# Patient Record
Sex: FEMALE | Race: WHITE | Employment: FULL TIME | URBAN - METROPOLITAN AREA
[De-identification: names, ages, dates, MRNs, and addresses within clinical notes are randomized per-mention and may not be internally consistent; named-entity substitution may affect disease eponyms.]

---

## 2024-01-03 NOTE — PROGRESS NOTES
Subjective   Patient ID: Anastasia Lindsey is a 63 y.o. female who presents for Annual Exam.     DR AYALA PT      1-9-23 neg-neg  PER PATIENT, always normal  MAMMO 1-12-23  DEXA 1-11-22 normal  COLON -3-24-23(FAMILY HISTORY PLUS POLYPS) goes every 2-3 years.     , C/S x2. Menopause 57. No HRT. No VB. Takes MVI with D. Uses external steroid cream once a week. Can wake her up at night. It's Clotrimazole/betam. It worked initially but not working as weel now.     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     maunt and cousin breast cancer.     Review of Systems   Genitourinary:         Vaginal itching.   All other systems reviewed and are negative.      Objective   Constitutional: Alert and in no acute distress. Well developed, well nourished.  Neck: no neck asymmetry. Supple and thyroid not enlarged and there were no palpable thyroid nodules.  Chest: Breasts normal appearance, no nipple discharge and no skin changes and palpation of breasts and axillae: no palpable mass and no axillary lymphadenopathy.  Abdomen: soft nontender; no abdominal mass palpated, no organomegaly and no hernias.  Genitourinary: external genitalia: little inflamed left upper labia majora with excoriations, no white lesions, no inguinal lymphadenopathy, Bartholin's urethral and [place]'s glands: normal, urethra: normal and bladder: normal on palpation.  Vagina: normal.  Cervix: normal.  Uterus: normal.   Right adnexa/parametria: normal.  Left adnexa/parametria: normal.  Skin: normal skin color and pigmentation, normal skin turgor and no rash.  Psychiatric: alert and oriented x 3, affect normal to patient baseline and mood appropriate.     Assessment/Plan   -no pap  -aviva-edel  -Vulvar handout given. Follow up vulvar biopsy. Stop using body wash. Doesn't use pads.

## 2024-01-04 ENCOUNTER — OFFICE VISIT (OUTPATIENT)
Dept: OBSTETRICS AND GYNECOLOGY | Facility: CLINIC | Age: 64
End: 2024-01-04
Payer: COMMERCIAL

## 2024-01-04 VITALS — BODY MASS INDEX: 33.47 KG/M2 | HEIGHT: 69 IN | WEIGHT: 226 LBS

## 2024-01-04 DIAGNOSIS — Z01.419 WELL WOMAN EXAM WITH ROUTINE GYNECOLOGICAL EXAM: Primary | ICD-10-CM

## 2024-01-04 PROCEDURE — 99396 PREV VISIT EST AGE 40-64: CPT | Performed by: OBSTETRICS & GYNECOLOGY

## 2024-01-04 PROCEDURE — 1036F TOBACCO NON-USER: CPT | Performed by: OBSTETRICS & GYNECOLOGY

## 2024-01-04 RX ORDER — MULTIVITAMIN
1 TABLET ORAL
COMMUNITY
Start: 2013-12-13

## 2024-01-04 RX ORDER — TRIAMTERENE/HYDROCHLOROTHIAZID 37.5-25 MG
TABLET ORAL
COMMUNITY
Start: 2020-01-03

## 2024-01-04 RX ORDER — ASPIRIN 81 MG/1
1 TABLET ORAL
COMMUNITY
Start: 2023-11-22

## 2024-01-04 RX ORDER — PROPRANOLOL/HYDROCHLOROTHIAZID 40 MG-25MG
TABLET ORAL
COMMUNITY

## 2024-01-04 RX ORDER — EZETIMIBE 10 MG/1
10 TABLET ORAL
COMMUNITY
Start: 2023-12-27

## 2024-01-04 RX ORDER — ROSUVASTATIN CALCIUM 10 MG/1
1 TABLET, COATED ORAL NIGHTLY
COMMUNITY
Start: 2023-09-05

## 2024-01-11 NOTE — PROGRESS NOTES
Patient ID: Anastasia Lindsey is a 63 y.o. female.    external steroid cream once a week. Can wake her up at night. It's Clotrimazole/betam. It worked initially but not working as weel now.     Patient stopped body wash, did handout, symptoms are still the same.    Biopsy vulva    Date/Time: 1/12/2024 12:00 PM    Performed by: Nadege Lockett MD  Authorized by: Nadege Lockett MD    Consent:     Consent obtained:  Written    Consent given by:  Patient    Risks discussed:  Bleeding and infection  Universal protocol:     Procedure explained and questions answered to patient or proxy's satisfaction: yes    Pre-procedure details:     Skin preparation:  Chlorhexidine    Preparation: Patient was prepped and draped in the usual sterile fashion    Anesthesia:     Anesthesia method:  Local infiltration    Local anesthetic:  Lidocaine 1% w/o epi  Procedure specific details:      A 5 mm punch biopsy was taken at the left upper labia majora. Patient identified this as the worse area. Touched with silver nitrate with good hemostasis.  Post-procedure details:     Procedure completion:  Tolerated

## 2024-01-12 ENCOUNTER — OFFICE VISIT (OUTPATIENT)
Dept: OBSTETRICS AND GYNECOLOGY | Facility: CLINIC | Age: 64
End: 2024-01-12
Payer: COMMERCIAL

## 2024-01-12 VITALS
HEIGHT: 69 IN | DIASTOLIC BLOOD PRESSURE: 80 MMHG | WEIGHT: 220 LBS | BODY MASS INDEX: 32.58 KG/M2 | SYSTOLIC BLOOD PRESSURE: 130 MMHG

## 2024-01-12 DIAGNOSIS — L29.2 VULVAR ITCHING: Primary | ICD-10-CM

## 2024-01-12 PROCEDURE — 88312 SPECIAL STAINS GROUP 1: CPT

## 2024-01-12 PROCEDURE — 88305 TISSUE EXAM BY PATHOLOGIST: CPT | Performed by: PATHOLOGY

## 2024-01-12 PROCEDURE — 88305 TISSUE EXAM BY PATHOLOGIST: CPT

## 2024-01-12 PROCEDURE — 88312 SPECIAL STAINS GROUP 1: CPT | Performed by: PATHOLOGY

## 2024-01-12 PROCEDURE — 1036F TOBACCO NON-USER: CPT | Performed by: OBSTETRICS & GYNECOLOGY

## 2024-01-12 PROCEDURE — 56605 BIOPSY OF VULVA/PERINEUM: CPT | Performed by: OBSTETRICS & GYNECOLOGY

## 2024-01-17 LAB
LABORATORY COMMENT REPORT: NORMAL
PATH REPORT.FINAL DX SPEC: NORMAL
PATH REPORT.GROSS SPEC: NORMAL
PATH REPORT.RELEVANT HX SPEC: NORMAL
PATH REPORT.TOTAL CANCER: NORMAL

## 2024-01-23 ENCOUNTER — TELEPHONE (OUTPATIENT)
Dept: OBSTETRICS AND GYNECOLOGY | Facility: CLINIC | Age: 64
End: 2024-01-23
Payer: COMMERCIAL

## 2024-01-23 RX ORDER — CLOBETASOL PROPIONATE 0.5 MG/G
OINTMENT TOPICAL 2 TIMES DAILY
Qty: 30 G | Refills: 1 | Status: SHIPPED | OUTPATIENT
Start: 2024-01-23 | End: 2024-02-19 | Stop reason: ALTCHOICE

## 2024-02-12 ENCOUNTER — HOSPITAL ENCOUNTER (OUTPATIENT)
Dept: RADIOLOGY | Facility: CLINIC | Age: 64
Discharge: HOME | End: 2024-02-12
Payer: COMMERCIAL

## 2024-02-12 DIAGNOSIS — Z12.31 ENCOUNTER FOR SCREENING MAMMOGRAM FOR MALIGNANT NEOPLASM OF BREAST: ICD-10-CM

## 2024-02-12 PROCEDURE — 77063 BREAST TOMOSYNTHESIS BI: CPT | Performed by: RADIOLOGY

## 2024-02-12 PROCEDURE — 77067 SCR MAMMO BI INCL CAD: CPT | Performed by: RADIOLOGY

## 2024-02-12 PROCEDURE — 77067 SCR MAMMO BI INCL CAD: CPT

## 2024-02-13 ENCOUNTER — TELEPHONE (OUTPATIENT)
Dept: OBSTETRICS AND GYNECOLOGY | Facility: CLINIC | Age: 64
End: 2024-02-13
Payer: COMMERCIAL

## 2024-02-13 DIAGNOSIS — R92.8 ABNORMAL MAMMOGRAM: Primary | ICD-10-CM

## 2024-02-16 ENCOUNTER — HOSPITAL ENCOUNTER (OUTPATIENT)
Dept: RADIOLOGY | Facility: CLINIC | Age: 64
Discharge: HOME | End: 2024-02-16
Payer: COMMERCIAL

## 2024-02-16 DIAGNOSIS — R92.8 ABNORMAL MAMMOGRAM: ICD-10-CM

## 2024-02-16 PROCEDURE — 76642 ULTRASOUND BREAST LIMITED: CPT | Mod: LEFT SIDE | Performed by: STUDENT IN AN ORGANIZED HEALTH CARE EDUCATION/TRAINING PROGRAM

## 2024-02-16 PROCEDURE — 77061 BREAST TOMOSYNTHESIS UNI: CPT | Mod: LEFT SIDE | Performed by: STUDENT IN AN ORGANIZED HEALTH CARE EDUCATION/TRAINING PROGRAM

## 2024-02-16 PROCEDURE — 77065 DX MAMMO INCL CAD UNI: CPT | Mod: LEFT SIDE | Performed by: STUDENT IN AN ORGANIZED HEALTH CARE EDUCATION/TRAINING PROGRAM

## 2024-02-16 PROCEDURE — 77061 BREAST TOMOSYNTHESIS UNI: CPT | Mod: LT

## 2024-02-16 PROCEDURE — 76642 ULTRASOUND BREAST LIMITED: CPT | Mod: LT

## 2024-02-16 NOTE — PROGRESS NOTES
Subjective   Patient ID: Anastasia Lindsey is a 64 y.o. female who presents for Follow-up.  Doing well after biopsy. Clobetasol working well.     Had routine mammogram 2/12/24, came home and had pain from side to nipple. Sticky clear discharge from left side. Went back on 2/16 for additional views. Discharge only on the left. Patient not squeezing it.       Objective   Constitutional: Alert and in no acute distress. Well developed, well nourished.  Breast: negative left breast. Scant clear discharge on surface.  Abdomen: soft nontender; no abdominal mass palpated, no organomegaly and no hernias.  Genitourinary: external genitalia: normal, no inguinal lymphadenopathy, Bartholin's urethral and [place]'s glands: normal, urethra: normal and bladder: normal on palpation.  Completely healed. Do not see the excoriations, etc as before.  Psychiatric: alert and oriented x 3, affect normal to patient baseline and mood appropriate.     Assessment/Plan   -It has only been a week since her first mammogram when the discharge started. Avoid any stimulation and will give at least another week for discharge to stop. Imaging showed only dilated ducts.  -Vulva healed. Will wean off Clobetasol in the next few months. I do not think she will need long term suppression. Continue vulvar care.

## 2024-02-19 ENCOUNTER — OFFICE VISIT (OUTPATIENT)
Dept: OBSTETRICS AND GYNECOLOGY | Facility: CLINIC | Age: 64
End: 2024-02-19
Payer: COMMERCIAL

## 2024-02-19 VITALS — WEIGHT: 223 LBS | SYSTOLIC BLOOD PRESSURE: 160 MMHG | BODY MASS INDEX: 32.93 KG/M2 | DIASTOLIC BLOOD PRESSURE: 80 MMHG

## 2024-02-19 DIAGNOSIS — N64.52 NIPPLE DISCHARGE: ICD-10-CM

## 2024-02-19 DIAGNOSIS — L29.2 VULVAR ITCHING: Primary | ICD-10-CM

## 2024-02-19 PROCEDURE — 1036F TOBACCO NON-USER: CPT | Performed by: OBSTETRICS & GYNECOLOGY

## 2024-02-19 PROCEDURE — 99213 OFFICE O/P EST LOW 20 MIN: CPT | Performed by: OBSTETRICS & GYNECOLOGY

## 2024-02-19 RX ORDER — CLOTRIMAZOLE AND BETAMETHASONE DIPROPIONATE 10; .64 MG/G; MG/G
CREAM TOPICAL 2 TIMES DAILY
COMMUNITY

## 2024-02-19 RX ORDER — DOCOSAHEXAENOIC ACID/EPA 120-180 MG
CAPSULE ORAL DAILY
COMMUNITY

## 2024-05-02 ENCOUNTER — TELEPHONE (OUTPATIENT)
Dept: OBSTETRICS AND GYNECOLOGY | Facility: CLINIC | Age: 64
End: 2024-05-02
Payer: COMMERCIAL

## 2024-05-02 DIAGNOSIS — L29.2 VULVAR ITCHING: Primary | ICD-10-CM

## 2024-05-02 RX ORDER — CLOBETASOL PROPIONATE 0.5 MG/G
OINTMENT TOPICAL 2 TIMES DAILY
Qty: 30 G | Refills: 1 | Status: SHIPPED | OUTPATIENT
Start: 2024-05-02

## 2024-05-02 RX ORDER — CLOBETASOL PROPIONATE 0.5 MG/G
OINTMENT TOPICAL 2 TIMES DAILY
Status: CANCELLED | OUTPATIENT
Start: 2024-05-02

## 2024-05-02 RX ORDER — CLOBETASOL PROPIONATE 0.5 MG/G
OINTMENT TOPICAL 2 TIMES DAILY
COMMUNITY

## 2024-05-02 RX ORDER — CLOBETASOL PROPIONATE 0.5 MG/G
CREAM TOPICAL 2 TIMES DAILY
COMMUNITY
End: 2024-05-02 | Stop reason: ENTERED-IN-ERROR

## 2024-11-01 DIAGNOSIS — L29.2 VULVAR ITCHING: ICD-10-CM

## 2024-11-01 RX ORDER — CLOBETASOL PROPIONATE 0.5 MG/G
OINTMENT TOPICAL 2 TIMES DAILY
Qty: 30 G | Refills: 0 | Status: SHIPPED | OUTPATIENT
Start: 2024-11-01

## 2024-12-19 ENCOUNTER — TELEPHONE (OUTPATIENT)
Dept: OBSTETRICS AND GYNECOLOGY | Facility: HOSPITAL | Age: 64
End: 2024-12-19

## 2025-01-23 ENCOUNTER — APPOINTMENT (OUTPATIENT)
Dept: OBSTETRICS AND GYNECOLOGY | Facility: CLINIC | Age: 65
End: 2025-01-23
Payer: COMMERCIAL

## 2025-01-23 VITALS
SYSTOLIC BLOOD PRESSURE: 130 MMHG | HEIGHT: 69 IN | BODY MASS INDEX: 33.33 KG/M2 | DIASTOLIC BLOOD PRESSURE: 70 MMHG | WEIGHT: 225 LBS

## 2025-01-23 DIAGNOSIS — Z12.31 ENCOUNTER FOR SCREENING MAMMOGRAM FOR BREAST CANCER: Primary | ICD-10-CM

## 2025-01-23 DIAGNOSIS — Z01.419 WELL WOMAN EXAM WITH ROUTINE GYNECOLOGICAL EXAM: ICD-10-CM

## 2025-01-23 PROCEDURE — 3008F BODY MASS INDEX DOCD: CPT | Performed by: OBSTETRICS & GYNECOLOGY

## 2025-01-23 PROCEDURE — 99396 PREV VISIT EST AGE 40-64: CPT | Performed by: OBSTETRICS & GYNECOLOGY

## 2025-01-23 PROCEDURE — 1036F TOBACCO NON-USER: CPT | Performed by: OBSTETRICS & GYNECOLOGY

## 2025-02-13 ENCOUNTER — HOSPITAL ENCOUNTER (OUTPATIENT)
Dept: RADIOLOGY | Facility: CLINIC | Age: 65
Discharge: HOME | End: 2025-02-13
Payer: COMMERCIAL

## 2025-02-13 VITALS — HEIGHT: 69 IN | BODY MASS INDEX: 31.84 KG/M2 | WEIGHT: 215 LBS

## 2025-02-13 DIAGNOSIS — Z12.31 ENCOUNTER FOR SCREENING MAMMOGRAM FOR BREAST CANCER: ICD-10-CM

## 2025-02-13 PROCEDURE — 77067 SCR MAMMO BI INCL CAD: CPT | Performed by: RADIOLOGY

## 2025-02-13 PROCEDURE — 77067 SCR MAMMO BI INCL CAD: CPT

## 2025-02-13 PROCEDURE — 77063 BREAST TOMOSYNTHESIS BI: CPT | Performed by: RADIOLOGY

## 2025-05-17 NOTE — PROGRESS NOTES
Subjective   Patient ID: Anastasia Lindsey is a 65 y.o. female who presents for emb.    Patient recently had a CT scan at the [facility] for hematuria. They saw a thickened endometrium. On 4/26/25 had ultrasound follow up that showed a 1.3 cm lining with increased vascularity.  Has cystoscopy in June to follow up on hematuria. Saw blood in urine. Floating matter in urine. Never wears a pad. But has noticed blood on underwear since ultrasound.    She had a D&C in 2017. With Dr. Osei. She was menopausal but would get gushes of blood but go months without. No findings at D&C, no bleeding since.      Objective   Constitutional: Alert and in no acute distress. Well developed, well nourished.  Abdomen: soft nontender; no abdominal mass palpated, no organomegaly and no hernias.  Genitourinary: external genitalia: normal, no inguinal lymphadenopathy, Bartholin's urethral and [place]'s glands: normal, urethra: normal and bladder: normal on palpation.  Vagina: normal. Little pink discharge.  Cervix: normal.  Uterus: normal.   Right adnexa/parametria: normal.  Left adnexa/parametria: normal.  Psychiatric: alert and oriented x 3, affect normal to patient baseline and mood appropriate.     Assessment/Plan   -EMB done, will call with results.  -Continues to do work up for hematuria. Possible that blood is only coming from the vagina.      Endometrial biopsy    Date/Time: 5/19/2025 9:59 AM    Performed by: Nadege Lockett MD  Authorized by: Nadege Lockett MD    Consent:     Consent obtained: written    Consent given by: patient    Risks discussed: bleeding and infection  Indications:     Indications: postmenopausal bleeding    Pre-procedure:     Urine pregnancy test: N/A    Procedure:     A bimanual exam was performed: yes      Uterus position: anteverted    Prepped with: Betadine    Tenaculum used: yes      A local block was performed: yes      Local anesthetic: lidocaine 1% w/o epi    Amount used (mL): 3    Cervix dilated: no       Number of passes: 3  Findings:     Cervix: normal      Uterus depth by sound (cm): 10    Specimen collected: specimen collected and sent to pathology      Patient tolerance: tolerated well, no immediate complications  Comments:     Procedure comments: Moderate tissue with 3 passes.

## 2025-05-19 ENCOUNTER — APPOINTMENT (OUTPATIENT)
Dept: OBSTETRICS AND GYNECOLOGY | Facility: CLINIC | Age: 65
End: 2025-05-19
Payer: COMMERCIAL

## 2025-05-19 VITALS — SYSTOLIC BLOOD PRESSURE: 122 MMHG | DIASTOLIC BLOOD PRESSURE: 68 MMHG | BODY MASS INDEX: 32.92 KG/M2 | WEIGHT: 223 LBS

## 2025-05-19 DIAGNOSIS — N95.0 POSTMENOPAUSAL BLEEDING: ICD-10-CM

## 2025-05-19 DIAGNOSIS — R93.89 THICKENED ENDOMETRIUM: Primary | ICD-10-CM

## 2025-05-19 PROCEDURE — 88305 TISSUE EXAM BY PATHOLOGIST: CPT | Performed by: STUDENT IN AN ORGANIZED HEALTH CARE EDUCATION/TRAINING PROGRAM

## 2025-05-19 PROCEDURE — 88305 TISSUE EXAM BY PATHOLOGIST: CPT

## 2025-05-19 PROCEDURE — 88342 IMHCHEM/IMCYTCHM 1ST ANTB: CPT | Performed by: STUDENT IN AN ORGANIZED HEALTH CARE EDUCATION/TRAINING PROGRAM

## 2025-05-19 PROCEDURE — 58100 BIOPSY OF UTERUS LINING: CPT | Performed by: OBSTETRICS & GYNECOLOGY

## 2025-05-19 PROCEDURE — 88342 IMHCHEM/IMCYTCHM 1ST ANTB: CPT

## 2025-05-19 PROCEDURE — 88341 IMHCHEM/IMCYTCHM EA ADD ANTB: CPT

## 2025-05-19 PROCEDURE — 88341 IMHCHEM/IMCYTCHM EA ADD ANTB: CPT | Performed by: STUDENT IN AN ORGANIZED HEALTH CARE EDUCATION/TRAINING PROGRAM

## 2025-05-28 LAB
LAB AP ASR DISCLAIMER: NORMAL
LABORATORY COMMENT REPORT: NORMAL
PATH REPORT.COMMENTS IMP SPEC: NORMAL
PATH REPORT.FINAL DX SPEC: NORMAL
PATH REPORT.GROSS SPEC: NORMAL
PATH REPORT.RELEVANT HX SPEC: NORMAL
PATH REPORT.TOTAL CANCER: NORMAL

## 2025-06-03 LAB
LAB AP ASR DISCLAIMER: NORMAL
LABORATORY COMMENT REPORT: NORMAL
PATH REPORT.ADDENDUM SPEC: NORMAL
PATH REPORT.COMMENTS IMP SPEC: NORMAL
PATH REPORT.FINAL DX SPEC: NORMAL
PATH REPORT.GROSS SPEC: NORMAL
PATH REPORT.RELEVANT HX SPEC: NORMAL
PATH REPORT.TOTAL CANCER: NORMAL

## 2025-06-04 PROBLEM — C54.1 ENDOMETRIAL ADENOCARCINOMA (MULTI): Status: ACTIVE | Noted: 2025-06-04

## 2025-06-05 ENCOUNTER — LAB (OUTPATIENT)
Dept: LAB | Facility: HOSPITAL | Age: 65
End: 2025-06-05
Payer: COMMERCIAL

## 2025-06-05 ENCOUNTER — PREP FOR PROCEDURE (OUTPATIENT)
Dept: OPERATING ROOM | Facility: HOSPITAL | Age: 65
End: 2025-06-05

## 2025-06-05 ENCOUNTER — OFFICE VISIT (OUTPATIENT)
Dept: GYNECOLOGIC ONCOLOGY | Facility: CLINIC | Age: 65
End: 2025-06-05
Payer: COMMERCIAL

## 2025-06-05 VITALS
BODY MASS INDEX: 32.41 KG/M2 | OXYGEN SATURATION: 98 % | HEART RATE: 79 BPM | RESPIRATION RATE: 17 BRPM | SYSTOLIC BLOOD PRESSURE: 121 MMHG | WEIGHT: 219.6 LBS | TEMPERATURE: 98.4 F | DIASTOLIC BLOOD PRESSURE: 79 MMHG

## 2025-06-05 DIAGNOSIS — C54.1 MALIGNANT NEOPLASM OF ENDOMETRIUM (MULTI): Primary | ICD-10-CM

## 2025-06-05 DIAGNOSIS — Z71.89 ENCOUNTER FOR SURGICAL COUNSELING: ICD-10-CM

## 2025-06-05 DIAGNOSIS — C54.1 ENDOMETRIAL CANCER (MULTI): Primary | ICD-10-CM

## 2025-06-05 LAB
CREAT SERPL-MCNC: 0.98 MG/DL (ref 0.5–1.05)
EGFRCR SERPLBLD CKD-EPI 2021: 65 ML/MIN/1.73M*2

## 2025-06-05 PROCEDURE — 1036F TOBACCO NON-USER: CPT | Performed by: STUDENT IN AN ORGANIZED HEALTH CARE EDUCATION/TRAINING PROGRAM

## 2025-06-05 PROCEDURE — 99205 OFFICE O/P NEW HI 60 MIN: CPT | Performed by: STUDENT IN AN ORGANIZED HEALTH CARE EDUCATION/TRAINING PROGRAM

## 2025-06-05 PROCEDURE — 36415 COLL VENOUS BLD VENIPUNCTURE: CPT

## 2025-06-05 PROCEDURE — 99215 OFFICE O/P EST HI 40 MIN: CPT | Performed by: STUDENT IN AN ORGANIZED HEALTH CARE EDUCATION/TRAINING PROGRAM

## 2025-06-05 PROCEDURE — 82565 ASSAY OF CREATININE: CPT

## 2025-06-05 RX ORDER — ACETAMINOPHEN 325 MG/1
975 TABLET ORAL ONCE
OUTPATIENT
Start: 2025-06-05 | End: 2025-06-05

## 2025-06-05 RX ORDER — GABAPENTIN 600 MG/1
600 TABLET ORAL ONCE
OUTPATIENT
Start: 2025-06-05 | End: 2025-06-05

## 2025-06-05 RX ORDER — CELECOXIB 200 MG/1
400 CAPSULE ORAL ONCE
OUTPATIENT
Start: 2025-06-05 | End: 2025-06-05

## 2025-06-05 RX ORDER — HEPARIN SODIUM 5000 [USP'U]/ML
5000 INJECTION, SOLUTION INTRAVENOUS; SUBCUTANEOUS ONCE
OUTPATIENT
Start: 2025-06-05 | End: 2025-06-05

## 2025-06-05 ASSESSMENT — PAIN SCALES - GENERAL: PAINLEVEL_OUTOF10: 6

## 2025-06-05 NOTE — PROGRESS NOTES
Patient ID: Anastasia Lindsey is a 65 y.o. female.  Referring Physician: No referring provider defined for this encounter.  Primary Care Provider: Jorge Camarena MD      Subjective    HPI    HPI:   Anastasia Lindsey is a 65 y.o. 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     She initially back pain and blood in the toilet in 2025. She was sent to follow with urology, and a subsequent CT showed endometrial thickening. She underwent a uterine US and EMB which found endometrial cancer. There has been vaginal bleeding, which has worsened since the biopsy. She also endorses abdominal pain right below the belly button.    They deny fever, chills, constipation, diarrhea, vaginal,nausea, vomiting, or any other symptoms other than those listed in the interval history.    PMH:  Medical History[1]  She does have a history of hypertension and hypercholesterolemia.  She has a hx of cardiac ablation () for SVT.      PSH:  Surgical History[2]  She has a history of caesarian section (x2) in & and tubal ligation(). She believes her tubes are still in place. She does have a history of knee surgery as well.    There is also a history of D&C and EMB in 2017 which showed a thickened endometrial lining, but no evidence of cancer. This was done by Dr. Osei before she retired.    OBHx:  The patient is a . She underwent menopause at 57. She used COCs for 5 years from [id number]. She did not use HRT.    Social:  They deny alcohol, tobacco, and recreational drug use. The patient lives at home with her . 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     FamHx:  Her mother had renal cell carcinoma, passed at 72. Also a history of heart disease.  Her father had non-hodgkin lymphoma. Also a hx of CAD and CKD, passed at age 80.  Paternal grandparents both passed of GI cancers, young, Unsure of the type of cancer.  Maternal aunt had a  history of breast cancer.    Their history is otherwise negative for a history of breast, ovarian, uterine, colon, pancreatic, and GI cancer.     Screening:  Cervical cancer: UTD with pap smears, no hx of abnormal pap smears  Mammogram:  [facility], Jan 2025  Colonoscopy: Has had 7 colonoscopies, polyp removal with each procedure but no evidence of cancer.      Review of Systems - Oncology     Objective   BSA: There is no height or weight on file to calculate BSA.  There were no vitals taken for this visit.     Family History[3]    Anastasia Lindsey  reports that she has never smoked. She has never used smokeless tobacco.  She  reports no history of alcohol use.  She  reports no history of drug use.    Physical Exam  Constitutional:       General: She is not in acute distress.     Appearance: Normal appearance. She is normal weight. She is not diaphoretic.   HENT:      Head: Normocephalic and atraumatic.      Mouth/Throat:      Mouth: Mucous membranes are moist.   Eyes:      General: No scleral icterus.     Pupils: Pupils are equal, round, and reactive to light.   Cardiovascular:      Rate and Rhythm: Normal rate and regular rhythm.      Pulses: Normal pulses.      Heart sounds: Normal heart sounds. No murmur heard.     No friction rub. No gallop.   Pulmonary:      Effort: Pulmonary effort is normal. No respiratory distress.      Breath sounds: Normal breath sounds. No stridor. No wheezing.   Abdominal:      General: Abdomen is flat. Bowel sounds are normal.      Palpations: Abdomen is soft.      Tenderness: There is no guarding or rebound.   Genitourinary:     Comments: Normal external genitalia without lesions or masses  Speculum Exam: Smooth vagina without lesions or masses, normal appearing cervix  without lesions or masses  Bimanual examination: Smooth vagina without lesions or masses, smooth cervix without lesions or masses, normal uterus, adnexa   Musculoskeletal:         General: No swelling.   Skin:     General:  Skin is warm and dry.      Coloration: Skin is not jaundiced or pale.   Neurological:      Mental Status: She is alert.   Psychiatric:         Mood and Affect: Mood normal.         Behavior: Behavior normal.         Performance Status:  Symptomatic; fully ambulatory    CT 4/19/25 Impression: Thickened endometrium.  Recommend follow-up sonogram     TVUS 4/26/25 showed an area of increased echogenicity of the low anterior body, likely   scarring from prior D&C.   -Size: 10.5 x 3.6 x 5.5 cm   -Orientation: Anteverted   -Endometrial echo complex: Evaluation of the endometrium was adequate.   The endometrial echo complex is thickened measuring 1.3 cm with increased   vascularity   Impression: Postmenopausal endometrial thickening with increased vascularity.   Recommend gynecologic consultation.     Biopsy results: Endometrioid carcinoma, preliminary FIGO grade 2-3, with squamous differentiation. Normal p53 and mismatch repair proteins.    Oncology History    No history exists.         Assessment/Plan      Anastasia Lindsey is a 65 y.o. presenting for evaluation of FIGO grade 2-3 endometrioid endometrial cancer MMRp, p53wt.    # Endometrial cancer  - Discussed the significance of histologic subtype, grade and stage  - Discussed management options including medical, non-surgical, and surgical options.   - Discussed recommendation for surgery with a hysterectomy, BSO, sentinel lymph node mapping and biopsy with possible need for unilateral or bilateral lymphadenectomy. We discussed my recommendation for a minimally invasive approach  - Discussed surgical risks, anticipated hospital stay, and recovery   - Ordered CT C/A/P to rule out metastatic disease   - Plan for laparoscopic hysterectomy with bilateral salpingo-oophorectomy and sentinel lymph node biopsy and mapping.   - She will need PAT  - She is a candidate for same day [facility]    Seen and discussed with Dr. Julio GONZALEZ, or a resident under my  supervision, was present with the medical student who participated in the documentation of this note.  I have personally seen and examined the patient and performed the medical decision-making components. I have reviewed the medical student documentation and/or resident documentation and verified the findings in the note as written with additions or exceptions as stated in the body of the note.    Susanna Kim MD, MS        [1]   Past Medical History:  Diagnosis Date    Personal history of other diseases of the circulatory system     History of essential hypertension    Personal history of other endocrine, nutritional and metabolic disease     History of hypercholesterolemia   [2]   Past Surgical History:  Procedure Laterality Date    OTHER SURGICAL HISTORY  10/04/2022    Knee arthroscopy    OTHER SURGICAL HISTORY  10/04/2022    Ablation    OTHER SURGICAL HISTORY  10/04/2022     section    OTHER SURGICAL HISTORY  10/04/2022    Colonoscopy   [3]   Family History  Problem Relation Name Age of Onset    Heart disease Mother      Hyperlipidemia Mother      Kidney cancer Mother      Heart disease Father      Heart attack Father      Hyperlipidemia Father      Lymphoma Father      Heart attack Brother      Hyperlipidemia Brother      Osteoporosis Maternal Grandmother      Colon cancer Paternal Grandfather      Breast cancer Mother's Sister      Breast cancer Maternal Cousin  50

## 2025-06-06 ENCOUNTER — SOCIAL WORK (OUTPATIENT)
Dept: CASE MANAGEMENT | Facility: HOSPITAL | Age: 65
End: 2025-06-06
Payer: COMMERCIAL

## 2025-06-06 NOTE — PROGRESS NOTES
Patient is a 65 year old female with dx endometrial adenocarcinoma. Patient had a new patient visit with Dr. Kim on 6/5/25. SW sent patient a [employer] message today with social work introduction and provided contact information for any support, resources or needs.

## 2025-06-09 ENCOUNTER — TELEPHONE (OUTPATIENT)
Dept: GYNECOLOGIC ONCOLOGY | Facility: HOSPITAL | Age: 65
End: 2025-06-09
Payer: COMMERCIAL

## 2025-06-16 ENCOUNTER — APPOINTMENT (OUTPATIENT)
Dept: RADIOLOGY | Facility: HOSPITAL | Age: 65
End: 2025-06-16
Payer: COMMERCIAL

## 2025-06-16 ENCOUNTER — APPOINTMENT (OUTPATIENT)
Dept: RADIOLOGY | Facility: CLINIC | Age: 65
End: 2025-06-16
Payer: COMMERCIAL

## 2025-06-16 ENCOUNTER — HOSPITAL ENCOUNTER (OUTPATIENT)
Dept: RADIOLOGY | Facility: HOSPITAL | Age: 65
Discharge: HOME | End: 2025-06-16
Payer: COMMERCIAL

## 2025-06-16 DIAGNOSIS — C54.1 ENDOMETRIAL CANCER (MULTI): ICD-10-CM

## 2025-06-16 PROCEDURE — 74177 CT ABD & PELVIS W/CONTRAST: CPT

## 2025-06-16 PROCEDURE — 71260 CT THORAX DX C+: CPT | Performed by: INTERNAL MEDICINE

## 2025-06-16 PROCEDURE — 2550000001 HC RX 255 CONTRASTS: Performed by: STUDENT IN AN ORGANIZED HEALTH CARE EDUCATION/TRAINING PROGRAM

## 2025-06-16 PROCEDURE — 74177 CT ABD & PELVIS W/CONTRAST: CPT | Performed by: INTERNAL MEDICINE

## 2025-06-16 RX ADMIN — IOHEXOL 75 ML: 350 INJECTION, SOLUTION INTRAVENOUS at 14:12

## 2025-06-19 ENCOUNTER — PRE-ADMISSION TESTING (OUTPATIENT)
Dept: PREADMISSION TESTING | Facility: HOSPITAL | Age: 65
End: 2025-06-19
Payer: COMMERCIAL

## 2025-06-19 ENCOUNTER — HOSPITAL ENCOUNTER (OUTPATIENT)
Dept: CARDIOLOGY | Facility: HOSPITAL | Age: 65
Discharge: HOME | End: 2025-06-19
Payer: COMMERCIAL

## 2025-06-19 VITALS
WEIGHT: 219.1 LBS | HEIGHT: 69 IN | HEART RATE: 78 BPM | TEMPERATURE: 97.9 F | OXYGEN SATURATION: 97 % | DIASTOLIC BLOOD PRESSURE: 74 MMHG | SYSTOLIC BLOOD PRESSURE: 119 MMHG | BODY MASS INDEX: 32.45 KG/M2

## 2025-06-19 DIAGNOSIS — K76.0 FATTY LIVER: ICD-10-CM

## 2025-06-19 DIAGNOSIS — C54.1 ENDOMETRIAL CANCER (MULTI): ICD-10-CM

## 2025-06-19 DIAGNOSIS — Z01.818 PREOPERATIVE EXAMINATION: Primary | ICD-10-CM

## 2025-06-19 LAB
ABO GROUP (TYPE) IN BLOOD: NORMAL
ALBUMIN SERPL BCP-MCNC: 4.9 G/DL (ref 3.4–5)
ALP SERPL-CCNC: 68 U/L (ref 33–136)
ALT SERPL W P-5'-P-CCNC: 23 U/L (ref 7–45)
ANION GAP SERPL CALC-SCNC: 15 MMOL/L (ref 10–20)
ANION GAP SERPL CALC-SCNC: 15 MMOL/L (ref 10–20)
ANTIBODY SCREEN: NORMAL
AST SERPL W P-5'-P-CCNC: 23 U/L (ref 9–39)
BASOPHILS # BLD AUTO: 0.05 X10*3/UL (ref 0–0.1)
BASOPHILS NFR BLD AUTO: 0.8 %
BILIRUB SERPL-MCNC: 0.6 MG/DL (ref 0–1.2)
BUN SERPL-MCNC: 21 MG/DL (ref 6–23)
BUN SERPL-MCNC: 21 MG/DL (ref 6–23)
CALCIUM SERPL-MCNC: 10.4 MG/DL (ref 8.6–10.6)
CALCIUM SERPL-MCNC: 10.4 MG/DL (ref 8.6–10.6)
CHLORIDE SERPL-SCNC: 101 MMOL/L (ref 98–107)
CHLORIDE SERPL-SCNC: 101 MMOL/L (ref 98–107)
CO2 SERPL-SCNC: 28 MMOL/L (ref 21–32)
CO2 SERPL-SCNC: 28 MMOL/L (ref 21–32)
CREAT SERPL-MCNC: 0.89 MG/DL (ref 0.5–1.05)
CREAT SERPL-MCNC: 0.89 MG/DL (ref 0.5–1.05)
EGFRCR SERPLBLD CKD-EPI 2021: 73 ML/MIN/1.73M*2
EGFRCR SERPLBLD CKD-EPI 2021: 73 ML/MIN/1.73M*2
EOSINOPHIL # BLD AUTO: 0.22 X10*3/UL (ref 0–0.7)
EOSINOPHIL NFR BLD AUTO: 3.6 %
ERYTHROCYTE [DISTWIDTH] IN BLOOD BY AUTOMATED COUNT: 13 % (ref 11.5–14.5)
GLUCOSE SERPL-MCNC: 86 MG/DL (ref 74–99)
GLUCOSE SERPL-MCNC: 86 MG/DL (ref 74–99)
HCT VFR BLD AUTO: 44.1 % (ref 36–46)
HGB BLD-MCNC: 14.2 G/DL (ref 12–16)
IMM GRANULOCYTES # BLD AUTO: 0.01 X10*3/UL (ref 0–0.7)
IMM GRANULOCYTES NFR BLD AUTO: 0.2 % (ref 0–0.9)
LYMPHOCYTES # BLD AUTO: 2.42 X10*3/UL (ref 1.2–4.8)
LYMPHOCYTES NFR BLD AUTO: 39.2 %
MCH RBC QN AUTO: 28.3 PG (ref 26–34)
MCHC RBC AUTO-ENTMCNC: 32.2 G/DL (ref 32–36)
MCV RBC AUTO: 88 FL (ref 80–100)
MONOCYTES # BLD AUTO: 0.48 X10*3/UL (ref 0.1–1)
MONOCYTES NFR BLD AUTO: 7.8 %
NEUTROPHILS # BLD AUTO: 3 X10*3/UL (ref 1.2–7.7)
NEUTROPHILS NFR BLD AUTO: 48.4 %
NRBC BLD-RTO: 0 /100 WBCS (ref 0–0)
PLATELET # BLD AUTO: 271 X10*3/UL (ref 150–450)
POTASSIUM SERPL-SCNC: 4.2 MMOL/L (ref 3.5–5.3)
POTASSIUM SERPL-SCNC: 4.2 MMOL/L (ref 3.5–5.3)
PROT SERPL-MCNC: 8.1 G/DL (ref 6.4–8.2)
RBC # BLD AUTO: 5.02 X10*6/UL (ref 4–5.2)
RH FACTOR (ANTIGEN D): NORMAL
SODIUM SERPL-SCNC: 140 MMOL/L (ref 136–145)
SODIUM SERPL-SCNC: 140 MMOL/L (ref 136–145)
WBC # BLD AUTO: 6.2 X10*3/UL (ref 4.4–11.3)

## 2025-06-19 PROCEDURE — 80048 BASIC METABOLIC PNL TOTAL CA: CPT | Mod: CCI

## 2025-06-19 PROCEDURE — 93010 ELECTROCARDIOGRAM REPORT: CPT | Performed by: INTERNAL MEDICINE

## 2025-06-19 PROCEDURE — 86901 BLOOD TYPING SEROLOGIC RH(D): CPT

## 2025-06-19 PROCEDURE — 85025 COMPLETE CBC W/AUTO DIFF WBC: CPT

## 2025-06-19 PROCEDURE — 36415 COLL VENOUS BLD VENIPUNCTURE: CPT

## 2025-06-19 PROCEDURE — 99205 OFFICE O/P NEW HI 60 MIN: CPT | Performed by: FAMILY MEDICINE

## 2025-06-19 PROCEDURE — 87081 CULTURE SCREEN ONLY: CPT

## 2025-06-19 PROCEDURE — 80053 COMPREHEN METABOLIC PANEL: CPT

## 2025-06-19 RX ORDER — CHLORHEXIDINE GLUCONATE ORAL RINSE 1.2 MG/ML
SOLUTION DENTAL
Qty: 15 ML | Refills: 0 | Status: SHIPPED | OUTPATIENT
Start: 2025-06-19

## 2025-06-19 RX ORDER — CHLORHEXIDINE GLUCONATE 40 MG/ML
SOLUTION TOPICAL
Qty: 473 ML | Refills: 0 | Status: SHIPPED | OUTPATIENT
Start: 2025-06-19

## 2025-06-19 RX ORDER — ACETAMINOPHEN 500 MG
50 TABLET ORAL DAILY
COMMUNITY
Start: 2025-01-01

## 2025-06-19 ASSESSMENT — ENCOUNTER SYMPTOMS
NECK NEGATIVE: 1
ARTHRALGIAS: 1
CARDIOVASCULAR NEGATIVE: 1
RESPIRATORY NEGATIVE: 1
CONSTITUTIONAL NEGATIVE: 1
EYES NEGATIVE: 1
NEUROLOGICAL NEGATIVE: 1
ABDOMINAL PAIN: 1
ENDOCRINE NEGATIVE: 1

## 2025-06-19 ASSESSMENT — DUKE ACTIVITY SCORE INDEX (DASI)
CAN YOU DO LIGHT WORK AROUND THE HOUSE LIKE DUSTING OR WASHING DISHES: YES
CAN YOU WALK INDOORS, SUCH AS AROUND YOUR HOUSE: YES
CAN YOU CLIMB A FLIGHT OF STAIRS OR WALK UP A HILL: YES
CAN YOU DO HEAVY WORK AROUND THE HOUSE LIKE SCRUBBING FLOORS OR LIFTING AND MOVING HEAVY FURNITURE: YES
CAN YOU WALK A BLOCK OR TWO ON LEVEL GROUND: YES
CAN YOU DO MODERATE WORK AROUND THE HOUSE LIKE VACUUMING, SWEEPING FLOORS OR CARRYING GROCERIES: YES
CAN YOU TAKE CARE OF YOURSELF (EAT, DRESS, BATHE, OR USE TOILET): YES
CAN YOU DO YARD WORK LIKE RAKING LEAVES, WEEDING OR PUSHING A MOWER: YES
CAN YOU HAVE SEXUAL RELATIONS: YES
TOTAL_SCORE: 58.2
CAN YOU PARTICIPATE IN MODERATE RECREATIONAL ACTIVITIES LIKE GOLF, BOWLING, DANCING, DOUBLES TENNIS OR THROWING A BASEBALL OR FOOTBALL: YES
CAN YOU RUN A SHORT DISTANCE: YES
DASI METS SCORE: 9.9
CAN YOU PARTICIPATE IN STRENOUS SPORTS LIKE SWIMMING, SINGLES TENNIS, FOOTBALL, BASKETBALL, OR SKIING: YES

## 2025-06-19 ASSESSMENT — PAIN - FUNCTIONAL ASSESSMENT: PAIN_FUNCTIONAL_ASSESSMENT: 0-10

## 2025-06-19 ASSESSMENT — PAIN SCALES - GENERAL: PAINLEVEL_OUTOF10: 5 - MODERATE PAIN

## 2025-06-19 ASSESSMENT — LIFESTYLE VARIABLES: SMOKING_STATUS: NONSMOKER

## 2025-06-19 NOTE — PREPROCEDURE INSTRUCTIONS
Thank you for visiting [facility] ([facility]) today for your pre-procedure evaluation, you were seen by     PATIENCE Grover  Department of Anesthesiology and Perioperative Medicine  Main phone [phone]  Fax [phone]    This summary includes instructions and information to aid you during your perioperative period.  Please read carefully. If you have any questions about your visit today, please call the number listed above.  If you become ill or have any changes to your health before your surgery, please contact your primary care provider and alert your surgeon.    General Medications Instructions (see back for further medication instructions)  Hold all vitamins and supplements 7 days prior to surgery  Tylenol okay to continue, please HOLD Aleve/naproxen/ibuprofen (NSAIDs) for 7 days prior to surgery  No lotion/moisturizers or Deoderant after last shower prior to surgery    You will be called business day prior to surgery to confirm arrival time.     Preparing for Surgery       Preoperative Fasting Guidelines  Why must I stop eating and drinking near surgery time?  With sedation, food or liquid in your stomach can enter your lungs causing serious complications  Food can increase nausea and vomiting  When do I need to stop eating and drinking before my surgery?  Do not eat any food after midnight the night before your surgery/procedure.  You may have up to 13.5 ounces of clear liquid until TWO hours before your instructed arrival time to the hospital.  This includes water, black tea/coffee, (no milk or cream) apple juice, and electrolyte drinks (Gatorade)  You may chew gum until TWO hours before your surgery/procedure   Do not eat any food after midnight the night before your surgery/procedure. You may have up to 13.5 ounces of clear liquid until TWO hours before your instructed arrival time to the hospital.  This includes water, black tea/coffee, (no milk or cream)  apple juice, and electrolyte drinks (Gatorade). You may chew gum until TWO hours before your surgery/procedure         Home Preoperative Antibacterial Shower     What is a home preoperative antibacterial shower?  This shower is a way of cleaning the skin with a germ killing soap before surgery.  The soap contains chlorhexidine, commonly known as CHG.  CHG is a soap for your skin with germ killing ability.  Let your doctor know if you are allergic to chlorhexidine.    Why do I need to take a preoperative antibacterial shower?  Skin is not sterile.  It is best to try to make your skin as free of germs as possible before surgery.  Proper cleansing with a germ killing soap before surgery can lower the number of germs on your skin.  This helps to reduce the risk of infection at the surgical site.  Following the instructions listed below will help you prepare your skin for surgery.      How do I use the CHG skin cleanser?  Steps:  Begin using your CHG soap five days before your scheduled surgery on ________________________.    Days 1-4 Shower before bed:  Wash your face and genitals with your normal soap and rinse.           2.    Apply the CHG soap to a clean wet washcloth.  Turn the water off or move away                From the water spray to avoid premature rinsing of the CHG soap as you are applying.     3.   Lather your entire body from the neck down.  Do not use on your face or genitals.  4. Pay special attention to the area(s) where your incision(s) will be located unless they are on your face.  Avoid scrubbing your skin too hard.  The important point is to have the CHG soap sit on your skin for 3 minutes.    When the 3 minutes are up, turn on the water and rinse the CHG soap off your body completely.   Pat yourself dry with a clean, freshly-laundered towel.  Dress in clean, freshly laundered night clothes.    Be sure to change bed sheets and blankets at least on the first night of CHG body wash use. May change  linens every night of the above protocol for maximum benefit.   Day 5:  Last shower is the morning of surgery: Follow above Instructions.    NOTE:        *Keep CHG soap out of eyes and ear canals   *DO NOT wash with regular soap on your body after you have used the CHG soap solution  *DO NOT apply powders, lotions, or perfume.  *Deodorant may be used days 1-4, BUT NOT the day of surgery          Patient Information: Pre-Operative Infection Prevention Measures     Why did I have my nose, under my arms and groin swabbed?  The purpose of the swab is to identify Staphylococcus aureus inside your nose or on your skin.  The swab was sent to the laboratory for culture.  A positive swab/culture for Staphylococcus aureus is called colonization or carriage.      What is Staphylococcus aureus?  Staphylococcus aureus, also known as “staph”, is a germ found on the skin or in the nose of healthy people.  Sometimes Staphylococcus aureus can get into the body and cause an infection.  This can be minor (such as pimples, boils or other skin problems).  It might also be serious (such as blood infection, pneumonia or a surgical site infection).    What is Staphylococcus aureus colonization or carriage?  Colonization or carriage means that a person has the germ but is not sick from it.  These bacteria can be spread on the hands or when breathing or sneezing.    How is Staphylococcus aureus spread?  It is most often spread by close contact with a person or item that carries it.    What happens if my culture is positive for Staphylococcus aureus?  Your doctor/medical team will use this information to guide any antibiotic treatment which may be necessary.  Regardless of the culture results, we will clean the inside of your nose with a betadine swab just before you have your surgery.      Will I get an infection if I have Staphylococcus aureus in my nose or on my skin?  Anyone can get an infection with Staphylococcus aureus.  However, the  best way to reduce your risk of infection is to follow the instructions provided to you for the use of your CHG soap and dental rinse.            Patient Information: Oral/Dental Rinse  **This is a prescription; pick it up at your preferred local pharmacy **  What is oral/dental rinse?   It is a mouthwash. It is a way of cleaning the mouth with a germ killing solution before your surgery.  The solution contains chlorhexidine, commonly known as CHG.   It is used inside the mouth to kill a bacteria known as Staphylococcus aureus.  Let your doctor know if you are allergic to Chlorhexidine.    Why do I need to use CHG oral/dental rinse?  The CHG oral/dental rinse helps to kill a bacteria in your mouth known a Staphylococcus aureus.     This reduces the risk of infection at the surgical site.      Using your CHG oral/dental rinse  STEPS:  Use your CHG oral/dental rinse after you brush your teeth the night before (at bedtime) and the morning of your surgery.  Follow all directions on your prescription label.    Use the cap on the container to measure 15ml (fill cap to fill line)  Swish (gargle if you can) the mouthwash in your mouth for at least 30 seconds, (do not to swallow) spit out  After you use your CHG rinse, do not rinse your mouth with water, drink or eat.  Please refer to prescription label for the appropriate time to resume oral intake  Dental rinse comes in one size bottle: 473ml ~16oz.  You will have leftover    rinse, discard after this use.    What side effects might I have using the CHG oral/dental rinse?  CHG rinse will stick to plaque on the teeth.  Brush and floss just before use.  Teeth brushing will help avoid staining of plaque during use.           Ensure Surgery Immuno-Nutrition Shake; This shake provides specialized nutrients to help prepare your body for surgery. Your surgeon's office may send it to your home, or you may receive it from [facility] if you are  scheduled for an appointment.  If your surgeon has instructed you to begin the shakes and you have not received them at least 7 days before your scheduled surgery, please contact your surgeon's office. You should begin drinking your shakes 6 days before your surgery date, start on _______.  You should drink 1 carton three times a day, you can have one carton with breakfast, lunch, and dinner.  If your surgeon has given you instructions to drink clear liquids the day before surgery, you can continue to drink your Ensure Surgery immune-nutrition shakes.     The Week before Surgery        Seven days before Surgery  Check your [facility] medication instructions  Do the exercises provided to you by [facility]   Arrange for a responsible, adult licensed  to take you home after surgery and stay with you for 24 hours.  You will not be permitted to drive yourself home if you have received any anesthetic/sedation  Five days before surgery  Check your [facility] medication instructions  Do the exercises provided to you by [facility]   Start using Chlorhexidene (CHG) body wash if prescribed (Continue till day of surgery)      The Day before Surgery       Check your [facility] medication and all other [facility] instructions including when to stop eating and drinking  You will be called with your arrival time for surgery in the late afternoon.  If you do not receive a call please reach out to your surgeon's office.  Do not smoke or drink 24 hours before surgery  Prepare items to bring with you to the hospital  Shower with your chlorhexidine wash if prescribed  Brush your teeth and use your chlorhexidine dental rinse if prescribed    The Day of Surgery       Check your [facility] medication instructions  Ensure you follow the instructions for when to stop eating and drinking  Shower, if prescribed use CHG.  Do not apply any lotions, creams, moisturizers, perfume or deodorant  Brush your teeth and use your CHG dental rinse if prescribed  Wear loose comfortable  clothing  Avoid make-up  Remove  jewelry and piercings, consider professional piercing removal with a plastic spacer if needed  Bring photo ID and Insurance card  Bring an accurate medication list that includes medication dose, frequency and allergies  Bring a copy of your advanced directives (will, health care power of )  Bring any devices and controllers as well as medical devices you have been provided with for surgery (CPAP, slings, braces, etc.)  Dentures, eyeglasses, and contacts will be removed before surgery, please bring cases for contacts or glasses    Preoperative Deep Breathing Exercises    Why it is important to do deep breathing exercises before my surgery?  Deep breathing exercises strengthen your breathing muscles.  This helps you to recover after your surgery and decreases the chance of breathing complications.    How are the deep breathing exercises done?  Sit straight with your back supported.  Breathe in deeply and slowly through your nose. Your lower rib cage should expand and your abdomen may move forward.  Hold that breath for 3 to 5 seconds.  Breathe out through pursed lips, slowly and completely.  Rest and repeat 10 times every hour while awake.  Rest longer if you become dizzy or lightheaded.       Preoperative Brain Exercises    What are brain exercises?  A brain exercise is any activity that engages your thinking (cognitive) skills.    What types of activities are considered brain exercises?  Jigsaw puzzles, crossword puzzles, word jumble, memory games, word search, and many more.  Many can be found free online or on your phone via a mobile jordan.    Why should I do brain exercises before my surgery?  More recent research has shown brain exercise before surgery can lower the risk of postoperative delirium (confusion) which can be especially important for older adults.  Patients who did brain exercises for 5 to 10 hours the days before surgery, cut their risk of postoperative  delirium in half up to 1 week after surgery.    Sit-to-Stand Exercise    What is the sit-to-stand exercise?  The sit-to-stand exercise strengthens the muscles of your lower body and muscles in the center of your body (core muscles for stability) helping to maintain and improve your strength and mobility.  How do I do the sit-to-stand exercise?  The goal is to do this exercise without using your arms or hands.  If this is too difficult, use your arms and hands or a chair with armrests to help slowly push yourself to the standing position and lower yourself back to the sitting position. As the movement becomes easier use your arms and hands less.    Steps to the sit-to-stand exercise  Sit up tall in a sturdy chair, knees bent, feet flat on the floor shoulder-width apart.  Shift your hips/pelvis forward in the chair to correctly position yourself for the next movement.  Lean forward at your hips.  Stand up straight putting equal weight on both feet.  Check to be sure you are properly aligned with the chair, in a slow controlled movement sit back down.  Repeat this exercise 10-15 times.  If needed you can do it fewer times until your strength improves.  Rest for 1 minute.  Do another 10-15 sit-to-stand exercises.  Try to do this in the morning and evening.       Simple things you can do to help prevent blood clots     Blood clots are blockages that can form in the body's veins. When a blood clot forms in your deep veins, it may be called a deep vein thrombosis, or DVT for short. Blood clots can happen in any part of the body where blood flows, but they are most common in the arms and legs. If a piece of a blood clot breaks free and travels to the lungs, it is called a pulmonary embolus (PE). A PE can be a very serious problem.      Being in the hospital or having surgery can raise your chances of getting a blood clot because you may not be well enough to move around as much as you normally do.         Ways you can help  prevent blood clots in the hospital       Wearing SCDs  SCDs stands for Sequential Compression Devices.   SCDs are special sleeves that wrap around your legs. They attach to a pump that fills them with air to gently squeeze your legs every few minutes.  This helps return the blood in your legs to your heart.   SCDs should only be taken off when walking or bathing. SCDs may not be comfortable, but they can help save your life.              Pump SCD leg sleeves  Wearing compression stockings - if your doctor orders them. These special snug-fitting stockings gently squeeze your legs to help blood flow.       Walking. Walking helps move the blood in your legs.   If your doctor says it is ok, try walking the halls at least   5 times a day. Ask us to help you get up, so you don't fall.      Taking any blood-thinning medicines your doctor orders.              Ways you can help prevent blood clots at home         Wearing compression stockings - if your doctor orders them.   Walking - to help move the blood in your legs.    Taking any blood-thinning medicines your doctor orders.      Signs of a blood clot or PE    Tell your doctor or nurse right away if you have any of the problems listed below.         If you are at home, seek medical care right away. Call 911 for chest pain or problems breathing.            Signs of a blood clot (DVT) - such as pain, swelling, redness, or warmth in your arm or legs.  Signs of a pulmonary embolism (PE) - such as chest pain or feeling short of breath

## 2025-06-19 NOTE — NURSING NOTE
Patient seen in PAT. Orders reviewed with PAT Provider.     Following labs obtained by documenting RN:   CBC,  MRSA SWAB,  BMP, T/S    EKG: COMPLETED    Patient discharged with: Pre-procedure instructions/AVS, Incentive spirometer, and ERAS Kit.        Luh SINGLETARYN, RN  [facility]   [facility]

## 2025-06-19 NOTE — CPM/PAT H&P
CPM/PAT Evaluation       Name: Anastasia Lindsey (Anastasia Lindsey)  /Age: 1960/65 y.o.        Visit Type:   In-Person       Chief Complaint: perioperative evaluation    HPI    Anastasia Lindsey is a 65 y.o. female scheduled for Total laparoscopic hysterectomy, bilateral salpingo-oophorectomy  [employer] lymph node mapping and biospy, any other indicated procedures  on 2025 secondary to Endometrial cancer with Dr. Kim who referred to CPM.  Presents to [facility] today for perioperative risk stratification and optimization. PMHx includes hyperlipidemia, hypertension, SVT in 2014 status post ablation, GERD, fatty liver      Medical History[1]    Surgical History[2]    Patient Sexual activity questions deferred to the physician.    Family History[3]    Allergies[4]    Prior to Admission medications    Medication Sig Start Date End Date Taking? Authorizing Provider   aspirin 81 mg EC tablet Take 1 tablet (81 mg) by mouth once a day on Monday, Wednesday, and Friday. 23   Historical Provider, MD   clobetasol (Temovate) 0.05 % ointment Apply topically 2 times a day.    Historical Provider, MD   clotrimazole-betamethasone (Lotrisone) cream Apply topically 2 times a day.    Historical Provider, MD   ezetimibe (Zetia) 10 mg tablet Take 1 tablet (10 mg) by mouth once daily. 23   Historical Provider, MD   fish oil concentrate (Fish OiL) 120-180 mg capsule Take by mouth once daily.    Historical Provider, MD   multivitamin tablet Take 1 tablet by mouth once daily. 13   Historical Provider, MD   rosuvastatin (Crestor) 10 mg tablet Take 1 tablet (10 mg) by mouth once daily at bedtime. 23   Historical Provider, MD   triamterene-hydrochlorothiazid (Maxzide-25) 37.5-25 mg tablet  1/3/20   Historical Provider, MD   turmeric-turmeric root extract 450-50 mg capsule Take by mouth.    Historical Provider, MD WEIR ROS:   Constitutional:   neg    Neuro/Psych:   neg    Eyes:   neg    Ears:   neg    Nose:  "  neg    Mouth:   neg    Throat:   neg    Neck:   neg    Cardio:   neg    Respiratory:   neg    Endocrine:   neg    GI:    abdominal pain (\"comes and goes\")  :    Hematuria    neg    Musculoskeletal:    arthralgias (back ache)  Hematologic:   neg    Skin:  neg        Physical Exam  Vitals reviewed. Physical exam within normal limits.   Constitutional:       Appearance: Normal appearance. She is normal weight.   HENT:      Head: Normocephalic.      Nose: Nose normal.   Cardiovascular:      Heart sounds: Normal heart sounds.   Pulmonary:      Effort: Pulmonary effort is normal.      Breath sounds: Normal breath sounds. No wheezing.   Musculoskeletal:         General: Normal range of motion.      Cervical back: Normal range of motion and neck supple.   Skin:     General: Skin is warm and dry.   Neurological:      General: No focal deficit present.      Mental Status: She is alert and oriented to person, place, and time.   Psychiatric:         Mood and Affect: Mood normal.          PAT AIRWAY:   Airway:     Mallampati::  III    TM distance::  >3 FB    Neck ROM::  Full  normal           Visit Vitals  /74   Pulse 78   Temp 36.6 °C (97.9 °F) (Temporal)   Ht 1.753 m (5' 9\")   Wt 99.4 kg (219 lb 1.6 oz)   SpO2 97%   BMI 32.36 kg/m²   OB Status Postmenopausal   Smoking Status Never   BSA 2.2 m²       DASI Risk Score      Flowsheet Row Pre-Admission Testing from 6/19/2025 in [facility] Questionnaire Series Submission from 6/6/2025 in [facility] MOS OR with Generic Provider Mychart   Can you take care of yourself (eat, dress, bathe, or use toilet)?  2.75 filed at 06/19/2025 1416 2.75  filed at 06/06/2025 0826   Can you walk indoors, such as around your house? 1.75 filed at 06/19/2025 1416 1.75  filed at 06/06/2025 0826   Can you walk a block or two on level ground?  2.75 filed at 06/19/2025 1416 2.75  filed at 06/06/2025 0826   Can you climb a flight of stairs or walk up a hill? 5.5 " filed at 06/19/2025 1416 5.5  filed at 06/06/2025 0826   Can you run a short distance? 8 filed at 06/19/2025 1416 8  filed at 06/06/2025 0826   Can you do light work around the house like dusting or washing dishes? 2.7 filed at 06/19/2025 1416 2.7  filed at 06/06/2025 0826   Can you do moderate work around the house like vacuuming, sweeping floors or carrying groceries? 3.5 filed at 06/19/2025 1416 3.5  filed at 06/06/2025 0826   Can you do heavy work around the house like scrubbing floors or lifting and moving heavy furniture?  8 filed at 06/19/2025 1416 8  filed at 06/06/2025 0826   Can you do yard work like raking leaves, weeding or pushing a mower? 4.5 filed at 06/19/2025 1416 4.5  filed at 06/06/2025 0826   Can you have sexual relations? 5.25 filed at 06/19/2025 1416 5.25  filed at 06/06/2025 0826   Can you participate in moderate recreational activities like golf, bowling, dancing, doubles tennis or throwing a baseball or football? 6 filed at 06/19/2025 1416 6  filed at 06/06/2025 0826   Can you participate in strenous sports like swimming, singles tennis, football, basketball, or skiing? 7.5 filed at 06/19/2025 1416 7.5  filed at 06/06/2025 0826   DASI SCORE 58.2 filed at 06/19/2025 1416 58.2  filed at 06/06/2025 0826   METS Score (Will be calculated only when all the questions are answered) 9.9 filed at 06/19/2025 1416 9.9  filed at 06/06/2025 0826          Zeferinoi DVT Assessment      Flowsheet Row Pre-Admission Testing from 6/19/2025 in [facility]   DVT Score (IF A SCORE IS NOT CALCULATING, MUST SELECT A BMI TO COMPLETE) 7 filed at 06/19/2025 1643   Medical Factors Present cancer, chemotherapy, or previous malignancy filed at 06/19/2025 1643   BMI (BMI MUST BE CHOSEN) 31-40 (Obesity) filed at 06/19/2025 1643          Modified Frailty Index    No data to display       [id number] Stroke Risk Points  Current as of 7 minutes ago        N/A 0 to 9 Points:      Last Change: N/A          The  [id number] risk score (Boaz KILLIAN et al. 2009. © 2010 American College of [employer]) quantifies the risk of stroke for a patient with atrial fibrillation. For patients without atrial fibrillation or under the age of 18 this score appears as N/A. Higher score values generally indicate higher risk of stroke.        This score is not applicable to this patient. Components are not calculated.          Revised Cardiac Risk Index      Flowsheet Row Pre-Admission Testing from 6/19/2025 in [facility]   High-Risk Surgery (Intraperitoneal, Intrathoracic,Suprainguinal vascular) 1 filed at 06/19/2025 1644   History of ischemic heart disease (History of MI, History of positive exercuse test, Current chest paint considered due to myocardial ischemia, Use of nitrate therapy, ECG with pathological Q Waves) 0 filed at 06/19/2025 1644   History of congestive heart failure (pulmonary edemia, bilateral rales or S3 gallop, Paroxysmal nocturnal dyspnea, CXR showing pulmonary vascular redistribution) 0 filed at 06/19/2025 1644   History of cerebrovascular disease (Prior TIA or stroke) 0 filed at 06/19/2025 1644   Pre-operative insulin treatment 0 filed at 06/19/2025 1644   Pre-operative creatinine>2 mg/dl 0 filed at 06/19/2025 1644   Revised Cardiac Risk Calculator 1 filed at 06/19/2025 1644          Apfel Simplified Score      Flowsheet Row Pre-Admission Testing from 6/19/2025 in [facility]   Smoking status 1 filed at 06/19/2025 1644   History of motion sickness or PONV  0 filed at 06/19/2025 1644   Use of postoperative opioids 1 filed at 06/19/2025 1644   Gender - Female 1=Yes filed at 06/19/2025 1644   Apfel Simplified Score Calculator 3 filed at 06/19/2025 1644          Risk Analysis Index Results This Encounter    No data found in the last 10 encounters.       Stop Bang Score      Flowsheet Row Pre-Admission Testing from 6/19/2025 in [facility] Questionnaire Series Submission  from 6/6/2025 in [facility] MOS OR with Generic Provider Harinder   Do you snore loudly? 0 filed at 06/19/2025 1415 0  filed at 06/06/2025 0826   Do you often feel tired or fatigued after your sleep? 0 filed at 06/19/2025 1415 0  filed at 06/06/2025 0826   Has anyone ever observed you stop breathing in your sleep? 0 filed at 06/19/2025 1415 0  filed at 06/06/2025 0826   Do you have or are you being treated for high blood pressure? 1 filed at 06/19/2025 1415 1  filed at 06/06/2025 0826   Recent BMI (Calculated) 31.7 filed at 06/19/2025 1415 31.7  filed at 06/06/2025 0826   Is BMI greater than 35 kg/m2? 0=No filed at 06/19/2025 1415 0=No  filed at 06/06/2025 0826   Age older than 50 years old? 1=Yes filed at 06/19/2025 1415 1=Yes  filed at 06/06/2025 0826   Is your neck circumference greater than 17 inches (Male) or 16 inches (Female)? 0 filed at 06/19/2025 1415 --   Gender - Male 0=No filed at 06/19/2025 1415 0=No  filed at 06/06/2025 0826   STOP-BANG Total Score 2 filed at 06/19/2025 1415 --          Prodigy: High Risk  Total Score: 8              Prodigy Age Score           ARISCAT Score for Postoperative Pulmonary Complications      Flowsheet Row Pre-Admission Testing from 6/19/2025 in [facility]   Age Calculated Score 3 filed at 06/19/2025 1644   Preoperative SpO2 0 filed at 06/19/2025 1644   Respiratory infection in the last month Either upper or lower (i.e., URI, bronchitis, pneumonia), with fever and antibiotic treatment 0 filed at 06/19/2025 1644   Preoperative anemia (Hgb less than 10 g/dl) 0 filed at 06/19/2025 1644   Surgical incision  0 filed at 06/19/2025 1644   Duration of surgery  16 filed at 06/19/2025 1644   Emergency Procedure  0 filed at 06/19/2025 1644   ARISCAT Total Score  19 filed at 06/19/2025 1644          Chris Perioperative Risk for Myocardial Infarction or Cardiac Arrest (MANJIT)      Flowsheet Row Pre-Admission Testing from 6/19/2025 in [facility]  "Center   Calculated Age Score 1.26 filed at 06/19/2025 1644   Functional Status  0 filed at 06/19/2025 1644   ASA Class  -3.29 filed at 06/19/2025 1644   Creatinine 0 filed at 06/19/2025 1644   Type of Procedure  0.76 filed at 06/19/2025 1644   MANJIT Total Score  -6.52 filed at 06/19/2025 1644   MANJIT % 0.15 filed at 06/19/2025 1644          Assessment and Plan:    Anesthesia/airway:  No anesthesia complications    Neuro:  No Neuro diagnosis or significant findings on chart review or clinical presentation and evaluation. No further preoperative testing/intervention indicated at this time.  Alert and oriented x 3.  No cognitive decline.     The patient is at increased risk for perioperative delirium secondary to  age, type and duration of surgery, Patient instructed on and provided cognitive exercises  --Brain exercise packet given to patient including word find, cross word, etc puzzles to be completed over the week leading up to the surgery.    Patient is at increased risk for perioperative CVA secondary to  cardiac disease, HTN    HEENT  No HEENT diagnosis or significant findings on chart review or clinical presentation and evaluation. No further preoperative testing/intervention indicated at this time.    Patient denies needing corrective lenses.  Denies dentures, missing teeth, broken teeth.  Denies hearing loss.    Cardiovascular  #Hypertension, hyperlipidemia, SVT status post ablation 2014- medicated with aspirin, Zetia, rosuvastatin, Maxide-25 and follows with PCP. Pt denies cardiovascular symptoms.  Patient denies exercise intolerance.  Hypertension is well-controlled. Physical exam is benign. METS is >4  and scheduled for non-cardiac surgery.  No additional preoperative testing is currently indicated.    Vital signs today are:  /74   Pulse 78   Temp 36.6 °C (97.9 °F) (Temporal)   Ht 1.753 m (5' 9\")   Wt 99.4 kg (219 lb 1.6 oz)   SpO2 97%   BMI 32.36 kg/m²     METS: 9.9  RCRI: 1 point, 6.0% 30 day " risk of MACE (risk for cardiac death, nonfatal myocardial infarction, and nonfactal cardiac arrest)  MANJIT: 0.15% risk for 30-day postoperative MACE  EKG -June 19, 2025  Normal sinus rhythm  Normal ECG  Pending cardiologist review         Pulmonary  No Pulmonary diagnosis or significant findings on chart review or clinical presentation and evaluation. Physical exam is benign, denies respiratory symptoms.  No further perioperative intervention.     Preoperative deep breathing educational handout provided to patient.    Patient is at increased risk of perioperative complications secondary to  age > 60, obesity, site of surgery, major surgery, duration of surgery > 2 hours, types of anesthetic    Stop Bang 2 point(s) which is a low risk for moderate to severe KARINA      ARISCAT: <26 points, 1.6% risk of in-hospital postoperative pulmonary complication         PRODIGY: 11 points which is a Moderate risk for opioid-induced respiratory depression          Pumonary toilet education discussed, patient also provided deep breathing exercises and incentive spirometry educational handout    Renal  No Renal diagnosis or significant findings on chart review or clinical presentation and evaluation. No further preoperative testing or intervention is indicated at this time.      No renal diagnosis, however patient is at increase risk for perioperative renal complications secondary to  Age equal to or greater than 56, BMI equal to or greater than 30, HTN, use of an ace, arb, or NSAID      Endocrine  No endocrine diagnosis or significant findings on chart review or clinical presentation and evaluation. No further testing or intervention is indicated at this time.    Hematologic/oncology  # endometrial cancer  -here for perioperative evaluation prior to total laparoscopic hysterectomy due to endometrial cancer.  Following with heme-onc        Patient confirms that they will accept blood should they need it.     Caprini Score 7, patient at  High risk for perioperative DVT.                      Patient provided VTE education/handout.      Gastrointestinal  #GERD -patient reports well-controlled GERD.   Patient is able to lie flat, no heartburn, no globus feeling in throat.  Denies GI/ symptoms.  Manages with diet    Eat-10 score 0: self-perceived oropharyngeal dysphagia scale (0-40)     Apfel 3 points 61% risk for post operative N/V    Infectious disease  No infectious diagnosis or significant findings on chart review or clinical presentation and evaluation.     Prescription provided for CHG body wash and dental rinse. CHG use instructions reviewed and provided to patient.  Staph screen collected    Patient denies use of antibiotics in the past 3 months.    Musculoskeletal  #Arthritis - patient has history of arthritis.  In multiple joints.  Instructed to avoid NSAIDs 7 days prior to surgery.  Expresses understanding.  No further workup needed.    Orders  Labs & Imaging ordered and review:  CBC, BMP, MRSA, T&S, EKG    Laboratory results  Recent Results (from the past 16 weeks)   Surgical Pathology Exam    Collection Time: 05/19/25 10:57 AM   Result Value Ref Range    Case Report       Surgical Pathology                                Case: [id number]                                  Authorizing Provider:  Nadege Lockett MD           Collected:           05/19/2025 1055              Ordering Location:     [facility]       Received:            05/19/2025 1053              Pathologist:           Kelly Hunter MD                                                            Specimen:    ENDOMETRIUM BIOPSY                                                                         FINAL DIAGNOSIS       A. 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              By the signature on this report, the individual or group listed as making the Final Interpretation/Diagnosis  certifies that they have reviewed this case.       Comment       Immunohistochemical stains for p53 and mismatch repair proteins are performed and the results will be reported in an addendum.      Addendum       MISMATCH REPAIR PROTEIN EXPRESSION    Date Reported: 05/28/2025   Surgical Number: [id number]   Block Number: A1  Specimen Site: Endometrium   Carcinoma Type: Endometrioid carcinoma        INTERPRETATION: Endometrial carcinoma with normal mismatch repair protein expression.      TEST RESULTS:  PROTEIN RESULT   MLH-1 :   POSITIVE       PMS-2 :  POSITIVE       MSH-2 : POSITIVE     MSH-6 : POSITIVE       : Not applicable.    NOTE:  The immunohistochemistry study of DNA mismatch repair protein expression reveals the normal presence of hMLH1, hPMS2, hMSH2, and hMSH6 in the tumor (normal internal controls stain appropriately).  The findings do not exclude underlying Benson Syndrome because some mutations may result in intact protein expression.  In addition, rare alterations can exist in other mismatch proteins, which have not been tested.    REFERENCE RANGE:  A result is reported positive if tumor staining is found in > 1% of tumor nuclei.    IHC:  Immunohistochemical staining (IHC) is used to determine the presence or absence of protein expression MLH1, MSH2, MSH6 and PMS2.  Lymphocytes and normal epithelial cells exhibit strong nuclear staining and serve as positive internal controls for staining of these proteins. Infiltrating carcinoma cells exhibiting nuclear staining are considered POSITIVE.      The stated protein mouse and rabbit monoclonal antibodies (MLH1-clone M1([employer]), MSH2- clone [id number]([employer]), MSH6-clone [id number] ([employer]), and  PMS2- clone [id number]([employer])) staining are performed on formalin fixed paraffin embedded specimens.  The method employed was a standard peroxidase labeled-polymer detection system from [employer]  (ultraView [place] DAB).  Each assay was performed using appropriate positive and negative controls, as well as evaluation of internal controls.    LDT:  One or more of the reagents used to perform assays on this specimen MAY have contained components considered to be Laboratory Developed Tests (LDT). LDT's have not been cleared or approved by the U.S. Food and Drug Administration. These assays/tests were developed by the Department of Pathology Immunohistochemistry Lab at [facility]. The FDA does not require this test to go through premarket FDA review.  This test is used for clinical purposes. It should not be regarded as investigational for research. This laboratory is certified under the Clinical Laboratory Improvement Amendments (CLIA) as qualified to perform high complexity clinical laboratory testing.  The assay/tests were performed with appropriate positive and negative controls, which stained appropriately.    CAUTIONS:  Test results should be interpreted in context of clinical findings, family history, and other laboratory data.  If results obtained do not match other clinical or laboratory findings, please contact the laboratory for possible interpretation.  Misinterpretation of results may occur if the information provided is inaccurate or incomplete.    REFERENCES:  Miko FAIRBANKS, Yanely ENGLAND; Benson Syndrome screening in the gynecologic tract. Current state of the art. Am J Surg Path 2016; 40: e35-44.   ---------------------------------------------------------------------------------------------------------------------------------------------------  TP53 IMMUNOHISTOCHEMISTRY FOR GYNECOLOGIC MALIGNANCIES    Date Reported: 05/28/2025   Surgical Number: [id number]   Block Number: A1  Specimen Site: Endometrium   Carcinoma Type: Endometrioid carcinoma         INTERPRETATION:  Normal (wild-type)  (PATTERN: patchy nuclear staining)                      REFERENCE RANGE:  "    Normal (wild-type) Expression Pattern: patchy nuclear staining    Abnormal (mutated) Expression Patterns:   Overexpression (strong, diffuse nuclear staining in greater than 80% of tumor cells)   Null (complete lack of nuclear or cytoplasmic expression)   Cytoplasmic staining (diffuse cytoplasmic staining with/without nuclear expression)    CAUTIONS:  TP53 mutations are not always the driving mutation in gynecologic carcinomas.  Secondary TP53 mutations are known to occur in the setting of POLE Ultramutated and Microsatelite Instability Hypermutated endometrial adenocarcinomas and may not share the same prognosis as Copy Number High (TP53 mutation driven) carcinomas.    While very reliable, the sensitivity and specificity for p53 testing by immunohistochemistry are less than 100%.  Confirmation by molecular testing (gold standard) may be advisable in some cases before major changes in clinical management are instituted.     REFERENCES:   Tracy et al. Interpretation of p53 immunohistochemistry in endometrial carcinomas: toward increased reproducibility.  Int J Gynecol Pathol. 2019;38 suppl1:R531-Y375.  Alex et al. Histotype-genotype correlation in 36 high-grade endometrial carcinomas. Am J Surg Pathol. 2013;37(9);5304-6006.  Cancer Genome [place] [employer]. Integrated genomic characterization of endometrial carcinoma. Nature. 2013; 497: 67-73.  Eyad MARVIN et al. Evolving roles of histologic evaluation and molecular/genomic profiling in the management of endometrial cancer. J. Natl Compr Canc Netw 2018;16:201-209    Eyad et al. High-grade Endometrial Carcinomas: Morphologic and immunohistochemical features, diagnostic challenges, and recommendations. Int J Gynecol Pathol. 2018;38:S40-S63          Clinical History       pmb/thickened endometrium      Gross Description       A: Received in formalin **on a brush*, labeled with the patient´s name and hospital number and \"EMB\", is a scant amount of mucus and " blood, aggregating to 2.5 x 1.5 x 0.2 cm.  The specimen is submitted in toto in 2 cassettes.  The specimen may not survive processing.  KE       Disclaimer       One or more of the reagents used to perform assays on this specimen MAY have contained components considered to be analyte specific reagents (ASR's).  ASR's have not been cleared or approved by the U.S. Food and Drug Administration.  These assays were developed and their performance characteristics determined by the Department of Pathology at [facility]. The FDA does not require this test to go through premarket FDA review. This test is used for clinical purposes. It should not be regarded as investigational or for research. This laboratory is certified under the Clinical Laboratory Improvement Amendments (CLIA) as qualified to perform high complexity clinical laboratory testing.  The assays were performed with appropriate positive and negative controls which stained appropriately.     Creatinine, Serum    Collection Time: 06/05/25  3:36 PM   Result Value Ref Range    Creatinine 0.98 0.50 - 1.05 mg/dL    eGFR 65 >60 mL/min/1.73m*2   CBC and Auto Differential    Collection Time: 06/19/25  2:46 PM   Result Value Ref Range    WBC 6.2 4.4 - 11.3 x10*3/uL    nRBC 0.0 0.0 - 0.0 /100 WBCs    RBC 5.02 4.00 - 5.20 x10*6/uL    Hemoglobin 14.2 12.0 - 16.0 g/dL    Hematocrit 44.1 36.0 - 46.0 %    MCV 88 80 - 100 fL    MCH 28.3 26.0 - 34.0 pg    MCHC 32.2 32.0 - 36.0 g/dL    RDW 13.0 11.5 - 14.5 %    Platelets 271 150 - 450 x10*3/uL    Neutrophils % 48.4 40.0 - 80.0 %    Immature Granulocytes %, Automated 0.2 0.0 - 0.9 %    Lymphocytes % 39.2 13.0 - 44.0 %    Monocytes % 7.8 2.0 - 10.0 %    Eosinophils % 3.6 0.0 - 6.0 %    Basophils % 0.8 0.0 - 2.0 %    Neutrophils Absolute 3.00 1.20 - 7.70 x10*3/uL    Immature Granulocytes Absolute, Automated 0.01 0.00 - 0.70 x10*3/uL    Lymphocytes Absolute 2.42 1.20 - 4.80 x10*3/uL    Monocytes  Absolute 0.48 0.10 - 1.00 x10*3/uL    Eosinophils Absolute 0.22 0.00 - 0.70 x10*3/uL    Basophils Absolute 0.05 0.00 - 0.10 x10*3/uL        Other  Hold all vitamins and supplements 7 days prior to surgery  Tylenol okay to continue, please hold Aleve/naproxen/ibuprofen/Excedrin/Motrin/Mobic (NSAIDs) for 7 days prior to surgery  No lotion/moisturizers or Deoderant after last shower prior to surgery    Medication, NPO, and all other CPM instructions were reviewed with the patient.  All patient questions were addressed.    Michelle Dias, APRN-CNP         [1]   Past Medical History:  Diagnosis Date    Arthritis     L hip    Endometrial cancer (Multi)     GERD (gastroesophageal reflux disease)     Hyperlipidemia     Hypertension     Irregular heart beat     SVT ablation in  - no symptoms since    Liver disease     fatty liver    Personal history of other diseases of the circulatory system     History of essential hypertension    Personal history of other endocrine, nutritional and metabolic disease     History of hypercholesterolemia   [2]   Past Surgical History:  Procedure Laterality Date    COLONOSCOPY      DILATION AND CURETTAGE OF UTERUS      OTHER SURGICAL HISTORY  10/04/2022    Knee arthroscopy    OTHER SURGICAL HISTORY  10/04/2022    Ablation    OTHER SURGICAL HISTORY  10/04/2022     section    OTHER SURGICAL HISTORY  10/04/2022    Colonoscopy    TUBAL LIGATION     [3]   Family History  Problem Relation Name Age of Onset    Heart disease Mother      Hyperlipidemia Mother      Kidney cancer Mother      Heart disease Father      Heart attack Father      Hyperlipidemia Father      Lymphoma Father      Heart attack Brother      Hyperlipidemia Brother      Osteoporosis Maternal Grandmother      Colon cancer Paternal Grandfather      Stomach cancer Paternal Grandfather      Breast cancer Mother's Sister      Breast cancer Maternal Cousin  50   [4]   Allergies  Allergen Reactions    Tetracyclines Hives  and Unknown    Atorvastatin Other     Joint pain    Naproxen GI Upset    Betadine [Povidone-Iodine] Rash      Maternal Cousin  50   [4]   Allergies  Allergen Reactions    Tetracyclines Hives and Unknown    Atorvastatin Other     Joint pain    Naproxen GI Upset    Betadine [Povidone-Iodine] Rash

## 2025-06-21 LAB — STAPHYLOCOCCUS SPEC CULT: NORMAL

## 2025-06-23 PROCEDURE — 93005 ELECTROCARDIOGRAM TRACING: CPT

## 2025-06-24 LAB
ATRIAL RATE: 76 BPM
P AXIS: 52 DEGREES
P OFFSET: 183 MS
P ONSET: 133 MS
PR INTERVAL: 166 MS
Q ONSET: 216 MS
QRS COUNT: 13 BEATS
QRS DURATION: 96 MS
QT INTERVAL: 368 MS
QTC CALCULATION(BAZETT): 414 MS
QTC FREDERICIA: 398 MS
R AXIS: 67 DEGREES
T AXIS: 39 DEGREES
T OFFSET: 400 MS
VENTRICULAR RATE: 76 BPM

## 2025-06-26 ENCOUNTER — ANESTHESIA EVENT (OUTPATIENT)
Dept: OPERATING ROOM | Facility: HOSPITAL | Age: 65
End: 2025-06-26
Payer: COMMERCIAL

## 2025-06-26 NOTE — HOSPITAL COURSE
Anastasia Lindsey is a 65 y.o. 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    Patient admitted on 6/28 for scheduledTLH, BSO, sentinel lymph node mapping, and biopsy. Case Uncomplicated, see postoperative note for details. QBL 15 cc. Admitted overnight for inability to wean supplemental O2. Overnight had oozing from RLQ port site incision but dressing was removed with slight gap in suture approximation. Dermabond and dressing reapplied and incision clean/dry/intact on POD1. By POD#1, patient weaned to room air. Patient with adequate pain control on PO medication regimen and making all appropriate milestones including ambulating, voiding spontaneously, and tolerating PO without N/V. Discharged home in stable condition with plan for POV Thu Jul 17 at 9:00 AM w/Dr. Kim for routine postoperative care. Patient started on a 14 day course of prophylactic apixiban as well given risk factors.

## 2025-06-27 ENCOUNTER — PHARMACY VISIT (OUTPATIENT)
Dept: PHARMACY | Facility: CLINIC | Age: 65
End: 2025-06-27
Payer: COMMERCIAL

## 2025-06-27 ENCOUNTER — HOSPITAL ENCOUNTER (OUTPATIENT)
Facility: HOSPITAL | Age: 65
Discharge: HOME | End: 2025-06-28
Attending: STUDENT IN AN ORGANIZED HEALTH CARE EDUCATION/TRAINING PROGRAM | Admitting: STUDENT IN AN ORGANIZED HEALTH CARE EDUCATION/TRAINING PROGRAM
Payer: COMMERCIAL

## 2025-06-27 ENCOUNTER — ANESTHESIA (OUTPATIENT)
Dept: OPERATING ROOM | Facility: HOSPITAL | Age: 65
End: 2025-06-27
Payer: COMMERCIAL

## 2025-06-27 DIAGNOSIS — Z98.890 POSTOPERATIVE STATE: Primary | ICD-10-CM

## 2025-06-27 DIAGNOSIS — C54.1 ENDOMETRIAL CANCER (MULTI): ICD-10-CM

## 2025-06-27 LAB
ABO GROUP (TYPE) IN BLOOD: NORMAL
RH FACTOR (ANTIGEN D): NORMAL

## 2025-06-27 PROCEDURE — 2500000004 HC RX 250 GENERAL PHARMACY W/ HCPCS (ALT 636 FOR OP/ED): Performed by: STUDENT IN AN ORGANIZED HEALTH CARE EDUCATION/TRAINING PROGRAM

## 2025-06-27 PROCEDURE — 2720000007 HC OR 272 NO HCPCS: Performed by: STUDENT IN AN ORGANIZED HEALTH CARE EDUCATION/TRAINING PROGRAM

## 2025-06-27 PROCEDURE — 3600000009 HC OR TIME - EACH INCREMENTAL 1 MINUTE - PROCEDURE LEVEL FOUR: Performed by: STUDENT IN AN ORGANIZED HEALTH CARE EDUCATION/TRAINING PROGRAM

## 2025-06-27 PROCEDURE — 3600000004 HC OR TIME - INITIAL BASE CHARGE - PROCEDURE LEVEL FOUR: Performed by: STUDENT IN AN ORGANIZED HEALTH CARE EDUCATION/TRAINING PROGRAM

## 2025-06-27 PROCEDURE — 88307 TISSUE EXAM BY PATHOLOGIST: CPT | Performed by: STUDENT IN AN ORGANIZED HEALTH CARE EDUCATION/TRAINING PROGRAM

## 2025-06-27 PROCEDURE — 36415 COLL VENOUS BLD VENIPUNCTURE: CPT | Performed by: ANESTHESIOLOGY

## 2025-06-27 PROCEDURE — 2500000001 HC RX 250 WO HCPCS SELF ADMINISTERED DRUGS (ALT 637 FOR MEDICARE OP): Performed by: STUDENT IN AN ORGANIZED HEALTH CARE EDUCATION/TRAINING PROGRAM

## 2025-06-27 PROCEDURE — 7100000011 HC EXTENDED STAY RECOVERY HOURLY - NURSING UNIT

## 2025-06-27 PROCEDURE — 88112 CYTOPATH CELL ENHANCE TECH: CPT | Mod: TC,MCY | Performed by: STUDENT IN AN ORGANIZED HEALTH CARE EDUCATION/TRAINING PROGRAM

## 2025-06-27 PROCEDURE — 3700000001 HC GENERAL ANESTHESIA TIME - INITIAL BASE CHARGE: Performed by: STUDENT IN AN ORGANIZED HEALTH CARE EDUCATION/TRAINING PROGRAM

## 2025-06-27 PROCEDURE — 2500000005 HC RX 250 GENERAL PHARMACY W/O HCPCS: Performed by: ANESTHESIOLOGY

## 2025-06-27 PROCEDURE — 2500000005 HC RX 250 GENERAL PHARMACY W/O HCPCS: Performed by: STUDENT IN AN ORGANIZED HEALTH CARE EDUCATION/TRAINING PROGRAM

## 2025-06-27 PROCEDURE — 38900 IO MAP OF SENT LYMPH NODE: CPT | Performed by: STUDENT IN AN ORGANIZED HEALTH CARE EDUCATION/TRAINING PROGRAM

## 2025-06-27 PROCEDURE — 2500000001 HC RX 250 WO HCPCS SELF ADMINISTERED DRUGS (ALT 637 FOR MEDICARE OP): Performed by: ANESTHESIOLOGY

## 2025-06-27 PROCEDURE — 96372 THER/PROPH/DIAG INJ SC/IM: CPT | Performed by: STUDENT IN AN ORGANIZED HEALTH CARE EDUCATION/TRAINING PROGRAM

## 2025-06-27 PROCEDURE — 88309 TISSUE EXAM BY PATHOLOGIST: CPT | Performed by: STUDENT IN AN ORGANIZED HEALTH CARE EDUCATION/TRAINING PROGRAM

## 2025-06-27 PROCEDURE — 38570 LAPAROSCOPY LYMPH NODE BIOP: CPT | Performed by: STUDENT IN AN ORGANIZED HEALTH CARE EDUCATION/TRAINING PROGRAM

## 2025-06-27 PROCEDURE — 2500000004 HC RX 250 GENERAL PHARMACY W/ HCPCS (ALT 636 FOR OP/ED): Performed by: ANESTHESIOLOGY

## 2025-06-27 PROCEDURE — 88305 TISSUE EXAM BY PATHOLOGIST: CPT | Performed by: STUDENT IN AN ORGANIZED HEALTH CARE EDUCATION/TRAINING PROGRAM

## 2025-06-27 PROCEDURE — 7100000001 HC RECOVERY ROOM TIME - INITIAL BASE CHARGE: Performed by: STUDENT IN AN ORGANIZED HEALTH CARE EDUCATION/TRAINING PROGRAM

## 2025-06-27 PROCEDURE — 88112 CYTOPATH CELL ENHANCE TECH: CPT | Performed by: PATHOLOGY

## 2025-06-27 PROCEDURE — 88307 TISSUE EXAM BY PATHOLOGIST: CPT | Mod: TC,SUR,WESLAB | Performed by: STUDENT IN AN ORGANIZED HEALTH CARE EDUCATION/TRAINING PROGRAM

## 2025-06-27 PROCEDURE — 7100000002 HC RECOVERY ROOM TIME - EACH INCREMENTAL 1 MINUTE: Performed by: STUDENT IN AN ORGANIZED HEALTH CARE EDUCATION/TRAINING PROGRAM

## 2025-06-27 PROCEDURE — RXMED WILLOW AMBULATORY MEDICATION CHARGE

## 2025-06-27 PROCEDURE — 3700000002 HC GENERAL ANESTHESIA TIME - EACH INCREMENTAL 1 MINUTE: Performed by: STUDENT IN AN ORGANIZED HEALTH CARE EDUCATION/TRAINING PROGRAM

## 2025-06-27 RX ORDER — SODIUM CHLORIDE, SODIUM LACTATE, POTASSIUM CHLORIDE, CALCIUM CHLORIDE 600; 310; 30; 20 MG/100ML; MG/100ML; MG/100ML; MG/100ML
75 INJECTION, SOLUTION INTRAVENOUS CONTINUOUS
Status: ACTIVE | OUTPATIENT
Start: 2025-06-27 | End: 2025-06-27

## 2025-06-27 RX ORDER — ALBUTEROL SULFATE 0.83 MG/ML
2.5 SOLUTION RESPIRATORY (INHALATION) ONCE AS NEEDED
Status: DISCONTINUED | OUTPATIENT
Start: 2025-06-27 | End: 2025-06-27 | Stop reason: HOSPADM

## 2025-06-27 RX ORDER — GABAPENTIN 600 MG/1
600 TABLET ORAL ONCE
Status: COMPLETED | OUTPATIENT
Start: 2025-06-27 | End: 2025-06-27

## 2025-06-27 RX ORDER — MIDAZOLAM HYDROCHLORIDE 2 MG/2ML
0.5 INJECTION, SOLUTION INTRAMUSCULAR; INTRAVENOUS
Status: DISCONTINUED | OUTPATIENT
Start: 2025-06-27 | End: 2025-06-27 | Stop reason: HOSPADM

## 2025-06-27 RX ORDER — LIDOCAINE HYDROCHLORIDE 20 MG/ML
INJECTION, SOLUTION INFILTRATION; PERINEURAL AS NEEDED
Status: DISCONTINUED | OUTPATIENT
Start: 2025-06-27 | End: 2025-06-27

## 2025-06-27 RX ORDER — WATER 1 ML/ML
INJECTION IRRIGATION AS NEEDED
Status: DISCONTINUED | OUTPATIENT
Start: 2025-06-27 | End: 2025-06-27 | Stop reason: HOSPADM

## 2025-06-27 RX ORDER — TRAMADOL HYDROCHLORIDE 50 MG/1
50 TABLET, FILM COATED ORAL EVERY 6 HOURS PRN
Qty: 12 TABLET | Refills: 0 | Status: SHIPPED | OUTPATIENT
Start: 2025-06-27 | End: 2025-06-28

## 2025-06-27 RX ORDER — LIDOCAINE HYDROCHLORIDE 10 MG/ML
0.1 INJECTION, SOLUTION EPIDURAL; INFILTRATION; INTRACAUDAL; PERINEURAL ONCE
Status: DISCONTINUED | OUTPATIENT
Start: 2025-06-27 | End: 2025-06-27 | Stop reason: HOSPADM

## 2025-06-27 RX ORDER — METRONIDAZOLE 500 MG/100ML
INJECTION, SOLUTION INTRAVENOUS AS NEEDED
Status: DISCONTINUED | OUTPATIENT
Start: 2025-06-27 | End: 2025-06-27

## 2025-06-27 RX ORDER — SODIUM CHLORIDE 0.9 G/100ML
INJECTION, SOLUTION IRRIGATION AS NEEDED
Status: DISCONTINUED | OUTPATIENT
Start: 2025-06-27 | End: 2025-06-27 | Stop reason: HOSPADM

## 2025-06-27 RX ORDER — FENTANYL CITRATE 50 UG/ML
INJECTION, SOLUTION INTRAMUSCULAR; INTRAVENOUS AS NEEDED
Status: DISCONTINUED | OUTPATIENT
Start: 2025-06-27 | End: 2025-06-27

## 2025-06-27 RX ORDER — INDOCYANINE GREEN AND WATER 25 MG
KIT INJECTION AS NEEDED
Status: DISCONTINUED | OUTPATIENT
Start: 2025-06-27 | End: 2025-06-27 | Stop reason: HOSPADM

## 2025-06-27 RX ORDER — CEFAZOLIN 1 G/1
INJECTION, POWDER, FOR SOLUTION INTRAVENOUS AS NEEDED
Status: DISCONTINUED | OUTPATIENT
Start: 2025-06-27 | End: 2025-06-27

## 2025-06-27 RX ORDER — METHOCARBAMOL 100 MG/ML
1000 INJECTION, SOLUTION INTRAMUSCULAR; INTRAVENOUS ONCE
Status: COMPLETED | OUTPATIENT
Start: 2025-06-27 | End: 2025-06-27

## 2025-06-27 RX ORDER — ROCURONIUM BROMIDE 10 MG/ML
INJECTION, SOLUTION INTRAVENOUS AS NEEDED
Status: DISCONTINUED | OUTPATIENT
Start: 2025-06-27 | End: 2025-06-27

## 2025-06-27 RX ORDER — HYDRALAZINE HYDROCHLORIDE 20 MG/ML
5 INJECTION INTRAMUSCULAR; INTRAVENOUS EVERY 30 MIN PRN
Status: DISCONTINUED | OUTPATIENT
Start: 2025-06-27 | End: 2025-06-27 | Stop reason: HOSPADM

## 2025-06-27 RX ORDER — OXYCODONE HYDROCHLORIDE 5 MG/1
5 TABLET ORAL EVERY 4 HOURS PRN
Status: DISCONTINUED | OUTPATIENT
Start: 2025-06-27 | End: 2025-06-27 | Stop reason: HOSPADM

## 2025-06-27 RX ORDER — CELECOXIB 200 MG/1
400 CAPSULE ORAL ONCE
Status: COMPLETED | OUTPATIENT
Start: 2025-06-27 | End: 2025-06-27

## 2025-06-27 RX ORDER — LABETALOL HYDROCHLORIDE 5 MG/ML
5 INJECTION, SOLUTION INTRAVENOUS ONCE AS NEEDED
Status: DISCONTINUED | OUTPATIENT
Start: 2025-06-27 | End: 2025-06-27 | Stop reason: HOSPADM

## 2025-06-27 RX ORDER — ONDANSETRON HYDROCHLORIDE 2 MG/ML
4 INJECTION, SOLUTION INTRAVENOUS ONCE AS NEEDED
Status: DISCONTINUED | OUTPATIENT
Start: 2025-06-27 | End: 2025-06-27 | Stop reason: HOSPADM

## 2025-06-27 RX ORDER — HYDROMORPHONE HYDROCHLORIDE 0.2 MG/ML
0.2 INJECTION INTRAMUSCULAR; INTRAVENOUS; SUBCUTANEOUS EVERY 5 MIN PRN
Status: DISCONTINUED | OUTPATIENT
Start: 2025-06-27 | End: 2025-06-27 | Stop reason: HOSPADM

## 2025-06-27 RX ORDER — ONDANSETRON 4 MG/1
4 TABLET, FILM COATED ORAL EVERY 6 HOURS PRN
Qty: 20 TABLET | Refills: 0 | Status: SHIPPED | OUTPATIENT
Start: 2025-06-27 | End: 2025-06-28

## 2025-06-27 RX ORDER — HEPARIN SODIUM 5000 [USP'U]/ML
5000 INJECTION, SOLUTION INTRAVENOUS; SUBCUTANEOUS ONCE
Status: COMPLETED | OUTPATIENT
Start: 2025-06-27 | End: 2025-06-27

## 2025-06-27 RX ORDER — ACETAMINOPHEN 325 MG/1
650 TABLET ORAL EVERY 6 HOURS PRN
Qty: 60 TABLET | Refills: 0 | Status: SHIPPED | OUTPATIENT
Start: 2025-06-27 | End: 2025-06-28

## 2025-06-27 RX ORDER — MIDAZOLAM HYDROCHLORIDE 1 MG/ML
INJECTION INTRAMUSCULAR; INTRAVENOUS AS NEEDED
Status: DISCONTINUED | OUTPATIENT
Start: 2025-06-27 | End: 2025-06-27

## 2025-06-27 RX ORDER — DIPHENHYDRAMINE HYDROCHLORIDE 50 MG/ML
12.5 INJECTION, SOLUTION INTRAMUSCULAR; INTRAVENOUS ONCE AS NEEDED
Status: DISCONTINUED | OUTPATIENT
Start: 2025-06-27 | End: 2025-06-27 | Stop reason: HOSPADM

## 2025-06-27 RX ORDER — DROPERIDOL 2.5 MG/ML
0.62 INJECTION, SOLUTION INTRAMUSCULAR; INTRAVENOUS ONCE AS NEEDED
Status: DISCONTINUED | OUTPATIENT
Start: 2025-06-27 | End: 2025-06-27 | Stop reason: HOSPADM

## 2025-06-27 RX ORDER — ACETAMINOPHEN 325 MG/1
975 TABLET ORAL ONCE
Status: COMPLETED | OUTPATIENT
Start: 2025-06-27 | End: 2025-06-27

## 2025-06-27 RX ORDER — PROPOFOL 10 MG/ML
INJECTION, EMULSION INTRAVENOUS AS NEEDED
Status: DISCONTINUED | OUTPATIENT
Start: 2025-06-27 | End: 2025-06-27

## 2025-06-27 RX ORDER — ONDANSETRON HYDROCHLORIDE 2 MG/ML
INJECTION, SOLUTION INTRAVENOUS AS NEEDED
Status: DISCONTINUED | OUTPATIENT
Start: 2025-06-27 | End: 2025-06-27

## 2025-06-27 RX ORDER — BUPIVACAINE HYDROCHLORIDE 5 MG/ML
INJECTION, SOLUTION PERINEURAL AS NEEDED
Status: DISCONTINUED | OUTPATIENT
Start: 2025-06-27 | End: 2025-06-27 | Stop reason: HOSPADM

## 2025-06-27 RX ORDER — SODIUM CHLORIDE, SODIUM LACTATE, POTASSIUM CHLORIDE, CALCIUM CHLORIDE 600; 310; 30; 20 MG/100ML; MG/100ML; MG/100ML; MG/100ML
INJECTION, SOLUTION INTRAVENOUS CONTINUOUS PRN
Status: DISCONTINUED | OUTPATIENT
Start: 2025-06-27 | End: 2025-06-27

## 2025-06-27 RX ORDER — HYDROMORPHONE HYDROCHLORIDE 1 MG/ML
INJECTION, SOLUTION INTRAMUSCULAR; INTRAVENOUS; SUBCUTANEOUS AS NEEDED
Status: DISCONTINUED | OUTPATIENT
Start: 2025-06-27 | End: 2025-06-27

## 2025-06-27 RX ORDER — POLYETHYLENE GLYCOL 3350 17 G/17G
17 POWDER, FOR SOLUTION ORAL DAILY PRN
Qty: 238 G | Refills: 0 | Status: SHIPPED | OUTPATIENT
Start: 2025-06-27 | End: 2025-06-28

## 2025-06-27 RX ORDER — ACETAMINOPHEN 325 MG/1
650 TABLET ORAL EVERY 4 HOURS PRN
Status: DISCONTINUED | OUTPATIENT
Start: 2025-06-27 | End: 2025-06-27 | Stop reason: HOSPADM

## 2025-06-27 RX ADMIN — HYDROMORPHONE HYDROCHLORIDE 0.2 MG: 0.2 INJECTION, SOLUTION INTRAMUSCULAR; INTRAVENOUS; SUBCUTANEOUS at 15:35

## 2025-06-27 RX ADMIN — CEFAZOLIN 2 G: 1 INJECTION, POWDER, FOR SOLUTION INTRAMUSCULAR; INTRAVENOUS at 12:29

## 2025-06-27 RX ADMIN — PROPOFOL 30 MG: 10 INJECTION, EMULSION INTRAVENOUS at 13:04

## 2025-06-27 RX ADMIN — ROCURONIUM BROMIDE 10 MG: 10 INJECTION, SOLUTION INTRAVENOUS at 13:29

## 2025-06-27 RX ADMIN — FENTANYL CITRATE 50 MCG: 50 INJECTION, SOLUTION INTRAMUSCULAR; INTRAVENOUS at 12:19

## 2025-06-27 RX ADMIN — SUGAMMADEX 200 MG: 100 INJECTION, SOLUTION INTRAVENOUS at 14:39

## 2025-06-27 RX ADMIN — METHOCARBAMOL 1000 MG: 1000 INJECTION, SOLUTION INTRAMUSCULAR; INTRAVENOUS at 15:06

## 2025-06-27 RX ADMIN — GABAPENTIN 600 MG: 600 TABLET, FILM COATED ORAL at 09:27

## 2025-06-27 RX ADMIN — MIDAZOLAM HYDROCHLORIDE 2 MG: 2 INJECTION, SOLUTION INTRAMUSCULAR; INTRAVENOUS at 12:11

## 2025-06-27 RX ADMIN — HEPARIN SODIUM 5000 UNITS: 5000 INJECTION, SOLUTION INTRAVENOUS; SUBCUTANEOUS at 09:29

## 2025-06-27 RX ADMIN — Medication 6 L/MIN: at 14:49

## 2025-06-27 RX ADMIN — HYDROMORPHONE HYDROCHLORIDE 0.4 MG: 0.5 INJECTION, SOLUTION INTRAMUSCULAR; INTRAVENOUS; SUBCUTANEOUS at 21:27

## 2025-06-27 RX ADMIN — Medication 2 L/MIN: at 20:30

## 2025-06-27 RX ADMIN — LIDOCAINE HYDROCHLORIDE 60 MG: 20 INJECTION, SOLUTION INFILTRATION; PERINEURAL at 12:20

## 2025-06-27 RX ADMIN — SODIUM CHLORIDE, SODIUM LACTATE, POTASSIUM CHLORIDE, AND CALCIUM CHLORIDE 75 ML/HR: .6; .31; .03; .02 INJECTION, SOLUTION INTRAVENOUS at 15:00

## 2025-06-27 RX ADMIN — LIDOCAINE HYDROCHLORIDE 40 MG: 20 INJECTION, SOLUTION INFILTRATION; PERINEURAL at 12:21

## 2025-06-27 RX ADMIN — HYDROMORPHONE HYDROCHLORIDE 0.2 MG: 1 INJECTION, SOLUTION INTRAMUSCULAR; INTRAVENOUS; SUBCUTANEOUS at 13:22

## 2025-06-27 RX ADMIN — FENTANYL CITRATE 50 MCG: 50 INJECTION, SOLUTION INTRAMUSCULAR; INTRAVENOUS at 12:45

## 2025-06-27 RX ADMIN — HYDROMORPHONE HYDROCHLORIDE 0.2 MG: 1 INJECTION, SOLUTION INTRAMUSCULAR; INTRAVENOUS; SUBCUTANEOUS at 13:58

## 2025-06-27 RX ADMIN — ACETAMINOPHEN 650 MG: 325 TABLET ORAL at 18:28

## 2025-06-27 RX ADMIN — ONDANSETRON 4 MG: 2 INJECTION INTRAMUSCULAR; INTRAVENOUS at 14:22

## 2025-06-27 RX ADMIN — HYDROMORPHONE HYDROCHLORIDE 0.2 MG: 0.2 INJECTION, SOLUTION INTRAMUSCULAR; INTRAVENOUS; SUBCUTANEOUS at 15:54

## 2025-06-27 RX ADMIN — ROCURONIUM BROMIDE 60 MG: 10 INJECTION, SOLUTION INTRAVENOUS at 12:22

## 2025-06-27 RX ADMIN — CELECOXIB 400 MG: 200 CAPSULE ORAL at 09:26

## 2025-06-27 RX ADMIN — SODIUM CHLORIDE, POTASSIUM CHLORIDE, SODIUM LACTATE AND CALCIUM CHLORIDE: 600; 310; 30; 20 INJECTION, SOLUTION INTRAVENOUS at 12:14

## 2025-06-27 RX ADMIN — HYDROMORPHONE HYDROCHLORIDE 0.2 MG: 1 INJECTION, SOLUTION INTRAMUSCULAR; INTRAVENOUS; SUBCUTANEOUS at 14:20

## 2025-06-27 RX ADMIN — METRONIDAZOLE 500 MG: 5 INJECTION, SOLUTION INTRAVENOUS at 12:29

## 2025-06-27 RX ADMIN — HYDROMORPHONE HYDROCHLORIDE 0.2 MG: 0.2 INJECTION, SOLUTION INTRAMUSCULAR; INTRAVENOUS; SUBCUTANEOUS at 18:11

## 2025-06-27 RX ADMIN — ACETAMINOPHEN 975 MG: 325 TABLET ORAL at 09:26

## 2025-06-27 RX ADMIN — DEXAMETHASONE SODIUM PHOSPHATE 4 MG: 4 INJECTION INTRA-ARTICULAR; INTRALESIONAL; INTRAMUSCULAR; INTRAVENOUS; SOFT TISSUE at 12:26

## 2025-06-27 RX ADMIN — Medication 3 L/MIN: at 15:05

## 2025-06-27 RX ADMIN — PROPOFOL 130 MG: 10 INJECTION, EMULSION INTRAVENOUS at 12:21

## 2025-06-27 ASSESSMENT — PAIN - FUNCTIONAL ASSESSMENT
PAIN_FUNCTIONAL_ASSESSMENT: 0-10
PAIN_FUNCTIONAL_ASSESSMENT: UNABLE TO SELF-REPORT
PAIN_FUNCTIONAL_ASSESSMENT: 0-10

## 2025-06-27 ASSESSMENT — PAIN SCALES - GENERAL
PAINLEVEL_OUTOF10: 7
PAINLEVEL_OUTOF10: 3
PAINLEVEL_OUTOF10: 2
PAINLEVEL_OUTOF10: 8
PAINLEVEL_OUTOF10: 2
PAINLEVEL_OUTOF10: 3
PAINLEVEL_OUTOF10: 7
PAINLEVEL_OUTOF10: 7
PAINLEVEL_OUTOF10: 3
PAIN_LEVEL: 0
PAINLEVEL_OUTOF10: 3
PAINLEVEL_OUTOF10: 2
PAINLEVEL_OUTOF10: 7
PAINLEVEL_OUTOF10: 2
PAINLEVEL_OUTOF10: 8
PAINLEVEL_OUTOF10: 3
PAINLEVEL_OUTOF10: 3
PAINLEVEL_OUTOF10: 5 - MODERATE PAIN
PAINLEVEL_OUTOF10: 0 - NO PAIN
PAINLEVEL_OUTOF10: 3
PAINLEVEL_OUTOF10: 4
PAINLEVEL_OUTOF10: 3
PAINLEVEL_OUTOF10: 3
PAINLEVEL_OUTOF10: 5 - MODERATE PAIN
PAINLEVEL_OUTOF10: 7
PAINLEVEL_OUTOF10: 2
PAINLEVEL_OUTOF10: 2
PAINLEVEL_OUTOF10: 3
PAINLEVEL_OUTOF10: 5 - MODERATE PAIN
PAINLEVEL_OUTOF10: 3
PAINLEVEL_OUTOF10: 6
PAINLEVEL_OUTOF10: 7

## 2025-06-27 ASSESSMENT — COLUMBIA-SUICIDE SEVERITY RATING SCALE - C-SSRS
1. IN THE PAST MONTH, HAVE YOU WISHED YOU WERE DEAD OR WISHED YOU COULD GO TO SLEEP AND NOT WAKE UP?: NO
2. HAVE YOU ACTUALLY HAD ANY THOUGHTS OF KILLING YOURSELF?: NO
6. HAVE YOU EVER DONE ANYTHING, STARTED TO DO ANYTHING, OR PREPARED TO DO ANYTHING TO END YOUR LIFE?: NO

## 2025-06-27 ASSESSMENT — PAIN DESCRIPTION - LOCATION
LOCATION: ABDOMEN
LOCATION: ABDOMEN

## 2025-06-27 NOTE — H&P
Gynecologic Oncology Surgical History and Physical    Anastasia Lindsey is a 65 y.o. 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 Knee surgery, D&C    Screening:  Cervical cancer: UTD with pap smears, no hx of abnormal pap smears  Mammogram:  UTD, Jan 2025  Colonoscopy: Has had 7 colonoscopies, polyp removal with each procedure but no evidence of cancer.    Tumor History:  Imaging/pathology:  5/19 path:   A. 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    6/16/25: CT A/P  IMPRESSION:  High-grade endometrial carcinoma with squamous differentiation,  initial staging scan.  1. The uterine mass lies in close proximity to the sigmoid colon,  with loss of the normal fat plane; proximal and distal to this area  is a long segment of wall thickening/edema, suspicious for early  invasion of the sigmoid colon.  2. Pulmonary nodules measuring up to 5 mm, for which short-term  follow-up CT in 8-12 weeks is recommended.  3. No pelvic/para-aortic lymphadenopathy. No peritoneal  carcinomatosis or hepatic metastasis. No evidence of skip metastasis.  4. Masslike thickening of the uterus, consistent with patient's  biopsy-proven neoplasm. Thickening and distention of the left  fallopian tube extending to the left ovary is noted, suspicious for  trans-tubal spread of disease to the ovary.    4/28/25 pelvic US  Uterus: Area of increased echogenicity of the low anterior body, likely   scarring from prior D&C.   -Size: 10.5 x 3.6 x 5.5 cm   -Orientation: Anteverted   -Endometrial echo complex: Evaluation of the endometrium was adequate.   The endometrial echo complex  "is thickened measuring 1.3 cm with increased   vascularity   -Cervix: Unremarkable.   -Adenomyosis assessment: There are no sonographic findings of adenomyosis.   -Fibroids: There are no fibroids.     Tumor Markers:  No results found for: \"\", \"\", \"CEA\"    Obstetrical History   OB History    Para Term  AB Living   2 2 2      SAB IAB Ectopic Multiple Live Births             # Outcome Date GA Lbr Danis/2nd Weight Sex Type Anes PTL Lv   2 Term            1 Term            The patient is a . She underwent menopause at 57. She used COCs for 5 years from 7882-6770. She did not use HRT     Past Medical History  Past Medical History:   Diagnosis Date    Arthritis     L hip    Endometrial cancer (Multi)     GERD (gastroesophageal reflux disease)     Hyperlipidemia     Hypertension     Irregular heart beat     SVT ablation in  - no symptoms since    Liver disease     fatty liver    Personal history of other diseases of the circulatory system     History of essential hypertension    Personal history of other endocrine, nutritional and metabolic disease     History of hypercholesterolemia        Past Surgical History   Past Surgical History:   Procedure Laterality Date    COLONOSCOPY      DILATION AND CURETTAGE OF UTERUS      OTHER SURGICAL HISTORY  10/04/2022    Knee arthroscopy    OTHER SURGICAL HISTORY  10/04/2022    Ablation    OTHER SURGICAL HISTORY  10/04/2022     section    OTHER SURGICAL HISTORY  10/04/2022    Colonoscopy    TUBAL LIGATION         Family History:  Family History   Problem Relation Name Age of Onset    Heart disease Mother      Hyperlipidemia Mother      Kidney cancer Mother      Heart disease Father      Heart attack Father      Hyperlipidemia Father      Lymphoma Father      Heart attack Brother      Hyperlipidemia Brother      Osteoporosis Maternal Grandmother      Colon cancer Paternal Grandfather      Stomach cancer Paternal Grandfather      Breast cancer " Mother's Sister      Breast cancer Maternal Cousin  50       Social History  Social History     Tobacco Use    Smoking status: Never    Smokeless tobacco: Never   Substance Use Topics    Alcohol use: Never     Substance and Sexual Activity   Drug Use Never       Allergies  Tetracyclines, Atorvastatin, Naproxen, and Betadine [povidone-iodine]     Medications  No medications prior to admission.       ROS: negative except per HPI    Objective    Last Vitals  There were no vitals taken for this visit.    Physical Examination  General: no acute distress  HEENT: normocephalic, atraumatic  Heart: warm and well perfused  Lungs: breathing comfortably on room air  Abdomen: nondistended  Extremities: moving all extremities  Neuro: awake and conversant  Psych: appropriate mood and affect    Lab Review  Results from last 7 days   Lab Units 06/19/25  1446   HEMOGLOBIN g/dL 14.2   HEMATOCRIT % 44.1   PLATELETS AUTO x10*3/uL 271   CREATININE mg/dL 0.89  0.89         Lab Results   Component Value Date    WBC 6.2 06/19/2025    HGB 14.2 06/19/2025    HCT 44.1 06/19/2025    MCV 88 06/19/2025     06/19/2025       Lab Results   Component Value Date    GLUCOSE 86 06/19/2025    GLUCOSE 86 06/19/2025    CALCIUM 10.4 06/19/2025    CALCIUM 10.4 06/19/2025     06/19/2025     06/19/2025    K 4.2 06/19/2025    K 4.2 06/19/2025    CO2 28 06/19/2025    CO2 28 06/19/2025     06/19/2025     06/19/2025    BUN 21 06/19/2025    BUN 21 06/19/2025    CREATININE 0.89 06/19/2025    CREATININE 0.89 06/19/2025       Assessment/Plan     Anastasia Lindsey is a 65 y.o. presenting for scheduled surgery.    Plan to proceed with total laparoscopic hysterectomy, bilateral salpingo-oophorectomy, sentinel lymph node mapping and biospy, any other indicated procedures  Surgical consent was reviewed. The risks of surgery were discussed including: bleeding (including need for blood transfusion in life-threatening situations; risks of  transfusion), infection, damage to surrounding organs. The patient had the opportunity to answer questions and desired to proceed with surgery following our discussion. Both verbal and written consents were obtained.    Genie Veliz MD  PGY-2, Obstetrics and Gynecology   Gyn Onc Pager [phone]

## 2025-06-27 NOTE — ANESTHESIA PROCEDURE NOTES
Airway  Date/Time: 6/27/2025 12:24 PM  Reason: elective    Airway not difficult    Staffing  Performed: resident   Authorized by: Nadia Cabrera MD    Performed by: Grace Morrow DO  Patient location during procedure: OR    Patient Condition  Indications for airway management: anesthesia and airway protection  Patient position: sniffing  Sedation level: deep     Final Airway Details   Preoxygenated: yes  Final airway type: endotracheal airway  Successful airway: ETT  Cuffed: yes   Successful intubation technique: direct laryngoscopy  Adjuncts used in placement: intubating stylet  Endotracheal tube insertion site: oral  Blade: Amy  Blade size: #3  ETT size (mm): 7.0  Cormack-Lehane Classification: grade I - full view of glottis  Placement verified by: chest auscultation and capnometry   Measured from: teeth  ETT to teeth (cm): 21  Number of attempts at approach: 1  Number of other approaches attempted: 0

## 2025-06-27 NOTE — OP NOTE
Total laparoscopic hysterectomy, bilateral salpingo-oophorectomy, [place] lymph node mapping and biospy Operative Note     Date: 2025  OR Location: [facility] OR    Name: Anastasia Lindsey, : 1960, Age: 65 y.o., MRN: [id number], Sex: female    Diagnosis  Pre-op Diagnosis      * Endometrial cancer (Multi) [C54.1] Post-op Diagnosis     * Endometrial cancer (Multi) [C54.1]     Procedures  Total laparoscopic hysterectomy, bilateral salpingo-oophorectomy  74123 - HI LAPAROSCOPY W TOTAL HYSTERECTOMY UTERUS 250 GM/<    [place] lymph node mapping and biospy  43366 - HI PEL LMPHADEC W/XTRNL ILIAC HYPOGSTR&OBTURATOR      Surgeons      * Susanna Kim - Primary    Resident/Fellow/Other Assistant:  Surgeons and Role:     * Brittany Kaur MD - Resident - Assisting     * Nilay Hdz MD - Fellow    Staff:   Relief Circulator: Yovana  Circulator: Josephine  Scrub Person: Angely Malik Scrub: Maggy    Anesthesia Staff: Anesthesiologist: Nadia Cabrera MD  Anesthesia Resident: Grace Morrow DO    Procedure Summary  Anesthesia: General  ASA: III  Estimated Blood Loss: 15mL  Intra-op Medications:   Administrations occurring from 1100 to 1435 on 25:   Medication Name Total Dose   sodium chloride 0.9 % irrigation solution 500 mL   surgical lubricant gel 1 Application   indocyanine green (IC-Green) injection 4 mg   sterile water irrigation solution 1,000 mL   ceFAZolin (Ancef) vial 1 g 2 g   dexAMETHasone (Decadron) 4 mg/mL IV Syringe 2 mL 4 mg   fentaNYL (Sublimaze) injection 50 mcg/mL 100 mcg   HYDROmorphone (Dilaudid) injection 1 mg/mL 0.6 mg   LR infusion Cannot be calculated   lidocaine (Xylocaine) injection 2 % 100 mg   metroNIDAZOLE (Flagyl)  mg/100 mL (premix) 500 mg   midazolam PF (Versed) injection 1 mg/mL 2 mg   ondansetron (Zofran) 2 mg/mL injection 4 mg   propofol (Diprivan) injection 10 mg/mL 160 mg   rocuronium (ZeMuron) 50 mg/5 mL injection 70 mg              Anesthesia Record                Intraprocedure I/O Totals          Intake    LR infusion 1000.00 mL    Total Intake 1000 mL       Output    Urine 250 mL    Total Output 250 mL       Net    Net Volume 750 mL          Specimen:   ID Type Source Tests Collected by Time   1 : PELVIC WASHINGS Non-Gynecologic Cytology PELVIC WASHING CYTOLOGY CONSULTATION (NON-GYNECOLOGIC) Susanna Kim MD, MS 6/27/2025 1253   2 : LEFT PELVIC SENTINEL LYMPH NODE Tissue PELVIC LYMPH NODE BIOPSY LEFT SURGICAL PATHOLOGY EXAM Susanna Kim MD, MS 6/27/2025 1303   3 : RIGHT PELVIC SENTINEL LYMPH NODE Tissue PELVIC LYMPH NODE BIOPSY RIGHT SURGICAL PATHOLOGY EXAM Susanna Kim MD, MS 6/27/2025 1324   4 : UTERUS, CERVIX, BILATERAL FALLOPIAN TUBES Tissue UTERUS, CERVIX, FALLOPIAN TUBES AND OVARIES BILATERAL SURGICAL PATHOLOGY EXAM Susanna Kim MD, MS 6/27/2025 1402                 Drains and/or Catheters:   [REMOVED] Urethral Catheter (Removed)             Findings: Unremarkable uterus, cervix, bilateral fallopian tubes, and ovaries. Normal external female genitalia and vagina. ICG dye mapped to sentinel lymph nodes in the left obturator fossa and along the right external iliac vessels. Unremarkable omentum, peritoneum, and liver surface.     Indications: Anastasia Lindsey is an 65 y.o. female who is having surgery for Endometrial cancer (Multi) [C54.1].    The patient was seen in the preoperative area. The risks, benefits, complications, treatment options, non-operative alternatives, expected recovery and outcomes were discussed with the patient. The possibilities of reaction to medication, pulmonary aspiration, injury to surrounding structures, bleeding, recurrent infection, the need for additional procedures, failure to diagnose a condition, and creating a complication requiring transfusion or operation were discussed with the patient. The patient concurred with the proposed plan, giving informed consent.  The site of surgery was properly  noted/marked if necessary per policy. The patient has been actively warmed in preoperative area. Preoperative antibiotics have been ordered and given within 1 hours of incision. Venous thrombosis prophylaxis have been ordered including bilateral sequential compression devices and chemical prophylaxis    Procedure Details: After informed consent was confirmed, the patient was taken to the operating room. A preoperative huddle and timeout were performed, with all OR personnel confirming correct patient and procedure. General anesthesia was induced. The patient was then placed in the dorsal lithotomy position on the operating room table using Jonathan stirrups. The patient was prepped and draped in a normal sterile fashion for a laparoscopic hysterectomy. A surgical pause was performed. The patient's identity and surgical procedure were again confirmed by all the surgical personnel.    A barber catheter was placed. We then started with the vaginal portion of the operation. A bivalve speculum was placed in the vagina, and the cervix was visualized. Indocyanine green dye was then injected at 3 o’clock and 9 o’clock, 1cc superficially and 1cc deep at each location, for a total of 4cc. A iConnect CRM system was then placed as a uterine manipulator. The speculum was then removed.      We then turned our attention to the laparoscopic portion of the operation. A 12mm supraumbilical vertical incision was made through the skin. This was carried down to the level of the fascia with two S retractors. The fascia was elevated with 2 kocher clamps and incised with a scalpel.  Both sides of the fascia were then tagged with 0-vicryl suture. The peritoneum was then elevated with 2 hemostats and was entered sharply with metzenbaum scissors. The graham port was then placed, and entry into the peritoneal cavity was confirmed with a 10mm scope. CO2 was then insufflated into the abdomen.     Once this was accomplished, we then carefully inspected the  abdomen, and there was no evidence of injury. An upper abdominal survey was unremarkable. The patient was placed in deep Trendelenburg. Three 5-mm accessory ports were placed under direct visualization. The small bowel was then manipulated out of the pelvis.     We then toggled back and forth from regular view to Firefly-nearIR mode in order to perform a bilateral sentinel pelvic lymph node dissection. The uterus was placed on traction. An incision along the left pelvic sidewall lateral to the IP ligament was made to enter the retroperitoneal space, and blunt dissection was performed to develop the pararectal and paravesical spaces. The left ureter was identified along its length. The parametria appeared free of disease bilaterally. The sentinel lymph node was visualized in the left obturator space,  carefully dissected, excised, and set aside. This process was similarly repeated on the right side, where again the sentinel lymph node was identified along the right external iliac artery. The lymph node was grasped, elevated, and dissected from surrounding vascular and lymphatic tissues, carefully avoiding the ureter with blunt dissection. Hemostasis was secure. We carefully inspected the areas and there was no evidence of bleeding. The obturator and genitofemoral nerves were conserved. The lymph nodes were set aside for later removal.     We then proceeded with the hysterectomy. The uterus was placed on counter tension, and the right round ligament was coagulated and incised. The retroperitoneal space was further developed parallel and lateral to the IP ligament on the right side. The right ureter was again identified and was noted to be well away from the IP ligament. The IP ligament was skeletonized, coagulated, and ligated. The posterior peritoneum was then incised taking care to note the location of the ureter. The peritoneal dissection was then carried anteriorly, and the bladder was carefully dissected off the  uterus and cervix. Attention was then turned to the patient's left side. In a similar fashion, the uterus was then placed on counter tension. The left round ligament was coagulated and incised, and the retroperitoneal space was developed parallel and lateral to the IP ligament. The left ureter was again identified. The IP ligament was then skeletonized, coagulated, and ligated. The posterior peritoneum was incised, taking care to note the location of the ureter. Attention again was turned anteriorly, ensuring that the bladder was well away from the uterus and cervix. The uterine vessels were then skeletonized bilaterally. They were then coagulated and ligated utilizing the LigaSure device. The LigaSure device was then used to take straight bites down the length of the cervix until we were at the level of the internal os utilizing the V Care manipulator's cup as a geographic land stoney. A circumferential colpotomy was then performed with cautery, and the uterus, cervix, fallopian tubes, ovaries, and sentinel lymph nodes were delivered through the patient's vagina and sent for permanent fixation. At this point, copious irrigation of the abdomen was performed.  A survey of the abdomen was additionally performed to confirm hemostasis.     The colpotomy was closed from above using a running suture of #0 V-loc suture. Hemostasis was achieved. The abdomen was again copiously irrigated. Hemostasis was again noted.     The 5mm ports were all removed under direct visualization. The 12mm port was removed and the fascia was then closed with another figure-of-eight suture of 0-Vicryl .  The skin of all port sites was closed using 4-0 Monocryl in a subcuticular fashion. Steristrips were applied as well as island dressings. The barber catheter was removed. Sponge, needle, instrument counts were correct x 2. Dr. Kim was present for the key portions of the procedure. The patient tolerated the procedure well and was returned to  the PACU in stable condition.    Menopause Status: This patient is post-menopausal.   Evidence of Infection: No   Complications:  None; patient tolerated the procedure well.    Disposition: PACU - hemodynamically stable.  Condition: stable       Nilay Hdz MD  Gynecologic Oncology Fellow      Additional Details: None    Attending Attestation: I was present for the entirety of the procedure(s).     Susanna Kim MD, MS     Susanna Kim  Phone Number: [phone]

## 2025-06-27 NOTE — SIGNIFICANT EVENT
"Post-Op Check    Anastasia Lindsey is a 65 y.o. F who is POD#1 from a TLH, BSO, sentinel lymph node mapping, and biopsy. Kept overnight for inability to wean supplemental O2.     Patient denies CP or SOB. States pain is well-controlled. Has ambulated and voided without difficulty. Tolerating PO intake. Patient concerned for persistent ooze at RLQ incision site. States gown had to be changed twice 2/2 blood saturation. Concerned this could be from intra-abdominal bleed.      /79   Pulse 60   Temp 36 °C (96.8 °F) (Temporal)   Resp 15   Ht 1.753 m (5' 9\")   Wt 98.6 kg (217 lb 6 oz)   SpO2 99%   BMI 32.10 kg/m²       General: no acute distress  HEENT: normocephalic, atraumatic  CV: warm and well perfused  Lungs: breathing comfortably on 2L NC > weaned to 1.5L while in room   Abdomen: RLQ incision with persistent ooze. Dressing removed, incision with slight gap in suture approximation. Dermabond applied and re-dressed with pressure dressing. Abdomen with appropriate post-op tenderness. No rebound or guarding   Extremities: moving all extremities spontaneously  Neuro: awake and conversant  Psych: appropriate mood and affect        Assessment & Plan    Postop  - Pain well controlled with pain medications per ERAS pathway  - Starting Hgb 14.2 -> EBL 15cc  - VS wnl  - Abdominal exam appropriate for clinical context; low c/f intra-abdominal bleed. Suspect bleeding 2/2 superficial suture defect. Derma-bond applied and incision re-dressed with pressure dressing. Will check AM CBC for additional reassurance.   - Previously requiring 2L O2 NC > now weaned to 1,5L.  IS at bedside and encouraged q1hr  - Regular diet, IVF now discontinued given PO tolerance   - Antiemetics, bowel reg PRN  - Fu AM CBC, as above   - Lawson out, voiding without difficulty   - DVT ppx: hep ppx. For discharge home on 14d course of Eliquis     Comorbidities  - HTN- Maxzide held   - HLD - Zetia, Crestor held   - SVT s/p cardiac ablation in 204 "       To be d/w AM GYN ONC team,     LISSA Mejia MD  PGY-3, Obstetrics & Gynecology   [facility]

## 2025-06-27 NOTE — ANESTHESIA POSTPROCEDURE EVALUATION
Patient: Anastasia Lindsey    Procedure Summary       Date: 06/27/25 Room / Location: [facility] OR 03 / [facility] OR    Anesthesia Start: 1214 Anesthesia Stop: 1452    Procedures:       Total laparoscopic hysterectomy, bilateral salpingo-oophorectomy      [place] lymph node mapping and biospy Diagnosis:       Endometrial cancer (Multi)      (Endometrial cancer (Multi) [C54.1])    Surgeons: Susanna Kim MD, MS Responsible Provider: Nadia Cabrera MD    Anesthesia Type: general ASA Status: 3            Anesthesia Type: general    Vitals Value Taken Time   /74 06/27/25 14:50   Temp 36 °C (96.8 °F) 06/27/25 14:49   Pulse 63 06/27/25 14:52   Resp 13 06/27/25 14:52   SpO2 100 % 06/27/25 14:52   Vitals shown include unfiled device data.    Anesthesia Post Evaluation    Patient location during evaluation: PACU  Patient participation: complete - patient participated  Level of consciousness: awake and alert  Pain score: 0  Pain management: adequate  Airway patency: patent  Cardiovascular status: acceptable  Respiratory status: acceptable  Hydration status: acceptable  Postoperative Nausea and Vomiting: none        No notable events documented.

## 2025-06-27 NOTE — ANESTHESIA PREPROCEDURE EVALUATION
Patient: Anastasia Lindsey    Procedure Information       Date/Time: 06/27/25 1100    Procedures:       Total laparoscopic hysterectomy, bilateral salpingo-oophorectomy      [place] lymph node mapping and biospy, any other indicated procedures    Location: [facility] OR 03 / [facility] OR    Surgeons: Susanna Kim MD, MS            Relevant Problems   GYN   (+) Endometrial adenocarcinoma (Multi)   (+) Endometrial cancer (Multi)       Clinical information reviewed:   Tobacco  Allergies  Meds   Med Hx  Surg Hx   Fam Hx  Soc Hx        NPO Detail:  NPO/Void Status  Carbohydrate Drink Given Prior to Surgery? : Y  Date of Last Liquid: 06/27/25  Time of Last Liquid: 0600  Date of Last Solid: 06/26/25  Time of Last Solid: 1800  Last Intake Type: Clear fluids  Time of Last Void: 0900         Physical Exam    Airway  Mallampati: II  TM distance: >3 FB  Neck ROM: limited  Comments: Decreased neck motion  Can bite upper lip     Cardiovascular   Rhythm: regular     Dental        Pulmonary    Abdominal            Anesthesia Plan    History of general anesthesia?: yes  History of complications of general anesthesia?: no    ASA 3     general     intravenous induction   Anesthetic plan and risks discussed with patient.    Plan discussed with resident and attending.

## 2025-06-27 NOTE — ANESTHESIA PROCEDURE NOTES
Peripheral IV  Date/Time: 6/27/2025 12:35 PM      Placement  Needle size: 16 G  Laterality: left  Location: hand  Site prep: chlorhexidine  Technique: anatomical landmarks  Attempts: 1

## 2025-06-28 VITALS
WEIGHT: 217.37 LBS | RESPIRATION RATE: 16 BRPM | TEMPERATURE: 97 F | HEIGHT: 69 IN | HEART RATE: 70 BPM | SYSTOLIC BLOOD PRESSURE: 101 MMHG | DIASTOLIC BLOOD PRESSURE: 61 MMHG | OXYGEN SATURATION: 96 % | BODY MASS INDEX: 32.2 KG/M2

## 2025-06-28 PROCEDURE — 99024 POSTOP FOLLOW-UP VISIT: CPT

## 2025-06-28 PROCEDURE — 7100000011 HC EXTENDED STAY RECOVERY HOURLY - NURSING UNIT

## 2025-06-28 PROCEDURE — 36415 COLL VENOUS BLD VENIPUNCTURE: CPT

## 2025-06-28 PROCEDURE — 2500000004 HC RX 250 GENERAL PHARMACY W/ HCPCS (ALT 636 FOR OP/ED)

## 2025-06-28 PROCEDURE — 2500000001 HC RX 250 WO HCPCS SELF ADMINISTERED DRUGS (ALT 637 FOR MEDICARE OP)

## 2025-06-28 RX ORDER — OXYCODONE HYDROCHLORIDE 5 MG/1
5 TABLET ORAL EVERY 4 HOURS PRN
Status: DISCONTINUED | OUTPATIENT
Start: 2025-06-28 | End: 2025-06-28 | Stop reason: HOSPADM

## 2025-06-28 RX ORDER — ONDANSETRON 4 MG/1
4 TABLET, ORALLY DISINTEGRATING ORAL EVERY 6 HOURS PRN
Status: DISCONTINUED | OUTPATIENT
Start: 2025-06-28 | End: 2025-06-28 | Stop reason: HOSPADM

## 2025-06-28 RX ORDER — ONDANSETRON HYDROCHLORIDE 2 MG/ML
4 INJECTION, SOLUTION INTRAVENOUS EVERY 6 HOURS PRN
Status: DISCONTINUED | OUTPATIENT
Start: 2025-06-28 | End: 2025-06-28 | Stop reason: HOSPADM

## 2025-06-28 RX ORDER — POLYETHYLENE GLYCOL 3350 17 G/17G
17 POWDER, FOR SOLUTION ORAL DAILY PRN
Qty: 170 G | Refills: 0 | Status: SHIPPED | OUTPATIENT
Start: 2025-06-28 | End: 2025-06-28 | Stop reason: SDUPTHER

## 2025-06-28 RX ORDER — NALOXONE HYDROCHLORIDE 0.4 MG/ML
0.1 INJECTION, SOLUTION INTRAMUSCULAR; INTRAVENOUS; SUBCUTANEOUS EVERY 5 MIN PRN
Status: DISCONTINUED | OUTPATIENT
Start: 2025-06-28 | End: 2025-06-28 | Stop reason: HOSPADM

## 2025-06-28 RX ORDER — SIMETHICONE 80 MG
80 TABLET,CHEWABLE ORAL 4 TIMES DAILY PRN
Status: DISCONTINUED | OUTPATIENT
Start: 2025-06-28 | End: 2025-06-28 | Stop reason: HOSPADM

## 2025-06-28 RX ORDER — CYCLOBENZAPRINE HCL 10 MG
10 TABLET ORAL 3 TIMES DAILY
Status: DISCONTINUED | OUTPATIENT
Start: 2025-06-28 | End: 2025-06-28 | Stop reason: HOSPADM

## 2025-06-28 RX ORDER — OXYCODONE HYDROCHLORIDE 5 MG/1
10 TABLET ORAL EVERY 4 HOURS PRN
Status: DISCONTINUED | OUTPATIENT
Start: 2025-06-28 | End: 2025-06-28 | Stop reason: HOSPADM

## 2025-06-28 RX ORDER — POLYETHYLENE GLYCOL 3350 17 G/17G
17 POWDER, FOR SOLUTION ORAL DAILY
Status: DISCONTINUED | OUTPATIENT
Start: 2025-06-28 | End: 2025-06-28 | Stop reason: HOSPADM

## 2025-06-28 RX ORDER — ONDANSETRON 4 MG/1
4 TABLET, FILM COATED ORAL EVERY 6 HOURS PRN
Qty: 20 TABLET | Refills: 0 | Status: SHIPPED | OUTPATIENT
Start: 2025-06-28 | End: 2025-06-28 | Stop reason: SDUPTHER

## 2025-06-28 RX ORDER — IBUPROFEN 600 MG/1
600 TABLET, FILM COATED ORAL EVERY 6 HOURS
Qty: 28 TABLET | Refills: 0 | Status: SHIPPED | OUTPATIENT
Start: 2025-06-28 | End: 2025-07-08 | Stop reason: SDUPTHER

## 2025-06-28 RX ORDER — ACETAMINOPHEN 325 MG/1
975 TABLET ORAL EVERY 6 HOURS
Status: DISCONTINUED | OUTPATIENT
Start: 2025-06-28 | End: 2025-06-28 | Stop reason: HOSPADM

## 2025-06-28 RX ORDER — SODIUM CHLORIDE, SODIUM LACTATE, POTASSIUM CHLORIDE, CALCIUM CHLORIDE 600; 310; 30; 20 MG/100ML; MG/100ML; MG/100ML; MG/100ML
40 INJECTION, SOLUTION INTRAVENOUS CONTINUOUS
Status: DISCONTINUED | OUTPATIENT
Start: 2025-06-28 | End: 2025-06-28

## 2025-06-28 RX ORDER — TRAMADOL HYDROCHLORIDE 50 MG/1
50 TABLET, FILM COATED ORAL EVERY 6 HOURS PRN
Qty: 12 TABLET | Refills: 0 | Status: SHIPPED | OUTPATIENT
Start: 2025-06-28 | End: 2025-06-28 | Stop reason: SDUPTHER

## 2025-06-28 RX ORDER — ACETAMINOPHEN 325 MG/1
650 TABLET ORAL EVERY 6 HOURS PRN
Qty: 60 TABLET | Refills: 0 | Status: SHIPPED | OUTPATIENT
Start: 2025-06-28 | End: 2025-06-28 | Stop reason: SDUPTHER

## 2025-06-28 RX ORDER — HEPARIN SODIUM 5000 [USP'U]/ML
5000 INJECTION, SOLUTION INTRAVENOUS; SUBCUTANEOUS EVERY 8 HOURS
Status: DISCONTINUED | OUTPATIENT
Start: 2025-06-28 | End: 2025-06-28 | Stop reason: HOSPADM

## 2025-06-28 RX ADMIN — ACETAMINOPHEN 975 MG: 325 TABLET ORAL at 00:31

## 2025-06-28 RX ADMIN — HYDROMORPHONE HYDROCHLORIDE 0.4 MG: 1 INJECTION, SOLUTION INTRAMUSCULAR; INTRAVENOUS; SUBCUTANEOUS at 01:42

## 2025-06-28 RX ADMIN — ACETAMINOPHEN 975 MG: 325 TABLET ORAL at 06:08

## 2025-06-28 RX ADMIN — SODIUM CHLORIDE, POTASSIUM CHLORIDE, SODIUM LACTATE AND CALCIUM CHLORIDE 40 ML/HR: 600; 310; 30; 20 INJECTION, SOLUTION INTRAVENOUS at 00:34

## 2025-06-28 RX ADMIN — HYDROMORPHONE HYDROCHLORIDE 0.4 MG: 1 INJECTION, SOLUTION INTRAMUSCULAR; INTRAVENOUS; SUBCUTANEOUS at 06:08

## 2025-06-28 SDOH — ECONOMIC STABILITY: FOOD INSECURITY: WITHIN THE PAST 12 MONTHS, YOU WORRIED THAT YOUR FOOD WOULD RUN OUT BEFORE YOU GOT THE MONEY TO BUY MORE.: NEVER TRUE

## 2025-06-28 SDOH — SOCIAL STABILITY: SOCIAL INSECURITY
WITHIN THE LAST YEAR, HAVE YOU BEEN RAPED OR FORCED TO HAVE ANY KIND OF SEXUAL ACTIVITY BY YOUR PARTNER OR EX-PARTNER?: NO

## 2025-06-28 SDOH — ECONOMIC STABILITY: FOOD INSECURITY: WITHIN THE PAST 12 MONTHS, THE FOOD YOU BOUGHT JUST DIDN'T LAST AND YOU DIDN'T HAVE MONEY TO GET MORE.: NEVER TRUE

## 2025-06-28 SDOH — HEALTH STABILITY: PHYSICAL HEALTH
HOW OFTEN DO YOU NEED TO HAVE SOMEONE HELP YOU WHEN YOU READ INSTRUCTIONS, PAMPHLETS, OR OTHER WRITTEN MATERIAL FROM YOUR DOCTOR OR PHARMACY?: NEVER

## 2025-06-28 SDOH — SOCIAL STABILITY: SOCIAL INSECURITY: ABUSE: ADULT

## 2025-06-28 SDOH — SOCIAL STABILITY: SOCIAL INSECURITY: ARE YOU OR HAVE YOU BEEN THREATENED OR ABUSED PHYSICALLY, EMOTIONALLY, OR SEXUALLY BY ANYONE?: NO

## 2025-06-28 SDOH — SOCIAL STABILITY: SOCIAL INSECURITY: DO YOU FEEL ANYONE HAS EXPLOITED OR TAKEN ADVANTAGE OF YOU FINANCIALLY OR OF YOUR PERSONAL PROPERTY?: NO

## 2025-06-28 SDOH — SOCIAL STABILITY: SOCIAL INSECURITY: DOES ANYONE TRY TO KEEP YOU FROM HAVING/CONTACTING OTHER FRIENDS OR DOING THINGS OUTSIDE YOUR HOME?: NO

## 2025-06-28 SDOH — SOCIAL STABILITY: SOCIAL INSECURITY: HAVE YOU HAD THOUGHTS OF HARMING ANYONE ELSE?: NO

## 2025-06-28 SDOH — ECONOMIC STABILITY: INCOME INSECURITY: IN THE PAST 12 MONTHS HAS THE ELECTRIC, GAS, OIL, OR WATER COMPANY THREATENED TO SHUT OFF SERVICES IN YOUR HOME?: NO

## 2025-06-28 SDOH — SOCIAL STABILITY: SOCIAL INSECURITY: WERE YOU ABLE TO COMPLETE ALL THE BEHAVIORAL HEALTH SCREENINGS?: YES

## 2025-06-28 SDOH — SOCIAL STABILITY: SOCIAL INSECURITY: ARE THERE ANY APPARENT SIGNS OF INJURIES/BEHAVIORS THAT COULD BE RELATED TO ABUSE/NEGLECT?: NO

## 2025-06-28 SDOH — SOCIAL STABILITY: SOCIAL INSECURITY
WITHIN THE LAST YEAR, HAVE YOU BEEN KICKED, HIT, SLAPPED, OR OTHERWISE PHYSICALLY HURT BY YOUR PARTNER OR EX-PARTNER?: NO

## 2025-06-28 SDOH — SOCIAL STABILITY: SOCIAL INSECURITY: WITHIN THE LAST YEAR, HAVE YOU BEEN AFRAID OF YOUR PARTNER OR EX-PARTNER?: NO

## 2025-06-28 SDOH — SOCIAL STABILITY: SOCIAL INSECURITY: WITHIN THE LAST YEAR, HAVE YOU BEEN HUMILIATED OR EMOTIONALLY ABUSED IN OTHER WAYS BY YOUR PARTNER OR EX-PARTNER?: NO

## 2025-06-28 SDOH — SOCIAL STABILITY: SOCIAL INSECURITY: DO YOU FEEL UNSAFE GOING BACK TO THE PLACE WHERE YOU ARE LIVING?: NO

## 2025-06-28 SDOH — SOCIAL STABILITY: SOCIAL INSECURITY: HAS ANYONE EVER THREATENED TO HURT YOUR FAMILY OR YOUR PETS?: NO

## 2025-06-28 SDOH — SOCIAL STABILITY: SOCIAL INSECURITY: HAVE YOU HAD ANY THOUGHTS OF HARMING ANYONE ELSE?: NO

## 2025-06-28 ASSESSMENT — LIFESTYLE VARIABLES
HOW OFTEN DO YOU HAVE A DRINK CONTAINING ALCOHOL: NEVER
SKIP TO QUESTIONS 9-10: 1
HOW OFTEN DO YOU HAVE 6 OR MORE DRINKS ON ONE OCCASION: NEVER
AUDIT-C TOTAL SCORE: 0
HOW MANY STANDARD DRINKS CONTAINING ALCOHOL DO YOU HAVE ON A TYPICAL DAY: PATIENT DOES NOT DRINK
AUDIT-C TOTAL SCORE: 0

## 2025-06-28 ASSESSMENT — ACTIVITIES OF DAILY LIVING (ADL)
PATIENT'S MEMORY ADEQUATE TO SAFELY COMPLETE DAILY ACTIVITIES?: YES
FEEDING YOURSELF: INDEPENDENT
TOILETING: INDEPENDENT
LACK_OF_TRANSPORTATION: NO
HEARING - LEFT EAR: FUNCTIONAL
HEARING - RIGHT EAR: FUNCTIONAL
JUDGMENT_ADEQUATE_SAFELY_COMPLETE_DAILY_ACTIVITIES: YES
WALKS IN HOME: INDEPENDENT
GROOMING: INDEPENDENT
LACK_OF_TRANSPORTATION: NO
ADEQUATE_TO_COMPLETE_ADL: YES
BATHING: INDEPENDENT
DRESSING YOURSELF: INDEPENDENT

## 2025-06-28 ASSESSMENT — COGNITIVE AND FUNCTIONAL STATUS - GENERAL
PATIENT BASELINE BEDBOUND: NO
CLIMB 3 TO 5 STEPS WITH RAILING: A LITTLE
MOBILITY SCORE: 23
DAILY ACTIVITIY SCORE: 24
MOBILITY SCORE: 24

## 2025-06-28 ASSESSMENT — PAIN SCALES - GENERAL
PAINLEVEL_OUTOF10: 4
PAINLEVEL_OUTOF10: 5 - MODERATE PAIN
PAINLEVEL_OUTOF10: 5 - MODERATE PAIN

## 2025-06-28 ASSESSMENT — PATIENT HEALTH QUESTIONNAIRE - PHQ9
1. LITTLE INTEREST OR PLEASURE IN DOING THINGS: NOT AT ALL
SUM OF ALL RESPONSES TO PHQ9 QUESTIONS 1 & 2: 0
2. FEELING DOWN, DEPRESSED OR HOPELESS: NOT AT ALL

## 2025-06-28 ASSESSMENT — PAIN - FUNCTIONAL ASSESSMENT
PAIN_FUNCTIONAL_ASSESSMENT: 0-10

## 2025-06-28 NOTE — DISCHARGE INSTRUCTIONS
Gynecologic Surgery Postoperative Instructions    Call the Gynecologic Oncology office at [phone] for any problems and/or concerns.    Walk as much as possible, it is ok to use stairs carefully  Continue the coughing and deep breathing exercises that your learned in the hospital  No lifting/straining greater than 10 pounds for 6 weeks (avoid strenuous activity)   Vaginal rest for 6 weeks (Do not put anything in your vagina. Do not use tampons or douches. Do not have sex until cleared by physician.)   Prevent constipation by using stool softeners and staying hydrated, so that you do not strain against your stitches or have pain from constipation    Call your doctor's office right away for:  Fever above 100.4/shaking chills  Bright red vaginal bleeding or bleeding that soaks more than two sanitary pads per hour  A foul smelling discharge from the vagina  Inability to urinate  Severe pain or bloating in your abdomen  Persistent nausea/vomiting  Redness, swelling, or drainage at your incision sites  For chest pain/shortness of breath-call [phone]    You will be going home with prescriptions for pain medication. If you are able to take them, alternate a dose of Acetaminophen (Tylenol) and Ibuprofen every 3 hours. (For example, 650 mg of Tylenol at 09:00am, 600 mg Ibuprofen at 12:00pm, 650 mg of Tylenol at 3:00pm, 600mg Ibuprofen at 6:00pm). You may also take the two medicines together every 6 hours if that is easier. Use any prescribed narcotic (tramadol or oxycodone) for breakthrough pain as prescribed. Use a stool softener, such as Miralax to prevent constipation from the narcotic medication.     Your incisions have bandages as well as surgical tape on them. You should remove the bandages the day after you are discharged from the hospital. You may shower with the surgical tape in place. Allow warm, soapy water to run over your incisions, then pat to dry. Do not scrub the incisions. The surgical tape will start  to peel off on its own in the a couple of days to a week. However, if the tape has not fallen off within a week, you may remove the surgical tape yourself.       medium/normal

## 2025-06-28 NOTE — PROGRESS NOTES
06/28/25 1003   Discharge Planning   Living Arrangements Spouse/significant other   Support Systems Spouse/significant other;Children   Assistance Needed Independent for adls   Type of Residence Private residence   Number of Stairs to Enter Residence 3   Number of Stairs Within Residence 10   Home or Post Acute Services None   Expected Discharge Disposition Home   Does the patient need discharge transport arranged? No   Financial Resource Strain   How hard is it for you to pay for the very basics like food, housing, medical care, and heating? Not hard   Housing Stability   In the last 12 months, was there a time when you were not able to pay the mortgage or rent on time? N   At any time in the past 12 months, were you homeless or living in a shelter (including now)? N   Transportation Needs   In the past 12 months, has lack of transportation kept you from medical appointments or from getting medications? no   In the past 12 months, has lack of transportation kept you from meetings, work, or from getting things needed for daily living? No   Stroke Family Assessment   Stroke Family Assessment Needed No   Intensity of Service   Intensity of Service 0-30 min       Previous Home Care: NA  DME: [facility]  Pharmacy: [facility]  Falls: Denies  PCP:  Jorge Camarena; last visit Feb 2025  Spoke with patient over the phone to complete admission assessment. Demographics verified. Patient states she lives at home with her . Independent for adls; safe at home. Patient state she normally drives self to appointments. Patient states no concerns obtaining/affording medications; states no social/financial concerns. Patient states family at bedside to provide transportation home today. Care Transitions team will continue to follow for any discharge planning needs.     Aisha FRANCOIS, RN  Transitional Care Coordinator (TCC)  [phone]

## 2025-06-28 NOTE — DISCHARGE SUMMARY
Discharge Diagnosis  Endometrial cancer (Multi)           Issues Requiring Follow-Up  POV Thu Jul 17 at 9:00 AM @ minoff    Discharge Meds     Medication List      START taking these medications     ibuprofen 600 mg tablet; Take 1 tablet (600 mg) by mouth every 6 hours   for 7 days.     CONTINUE taking these medications     * chlorhexidine 4 % external liquid; Commonly known as: Hibiclens; Use   as directed daily preoperatively for 5 days leading up to surgery, wash   body all over not on face or genital region, let sit on skin for 3 minutes   before rising.   * chlorhexidine 0.12 % solution; Commonly known as: Peridex; Swish and   spit with 15ml of solution the night before and morning of surgery. Do not   swallow.   clobetasol 0.05 % ointment; Commonly known as: Temovate   clotrimazole-betamethasone cream; Commonly known as: Lotrisone   ezetimibe 10 mg tablet; Commonly known as: Zetia   Fish OiL 120-180 mg capsule; Generic drug: fish oil concentrate   multivitamin tablet   rosuvastatin 10 mg tablet; Commonly known as: Crestor   triamterene-hydrochlorothiazid 37.5-25 mg tablet; Commonly known as:   Maxzide-25   turmeric-turmeric root extract 450-50 mg capsule   Vitamin D3 50 mcg (2,000 units) capsule; Generic drug: cholecalciferol  * This list has 2 medication(s) that are the same as other medications   prescribed for you. Read the directions carefully, and ask your doctor or   other care provider to review them with you.     ASK your doctor about these medications     aspirin 81 mg EC tablet       Test Results Pending At Discharge  Pending Labs       Order Current Status    Cytology Consultation (Non-Gynecologic) Collected (06/27/25 1253)    Surgical Pathology Exam In process            Hospital Course  Anastasia Lindsey is a 65 y.o. 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    Patient admitted on 6/28 for scheduledTLH, BSO, sentinel lymph node mapping, and biopsy. Case Uncomplicated, see postoperative note for details. QBL 15 cc. Admitted overnight for inability to wean supplemental O2. Overnight had oozing from RLQ port site incision but dressing was removed with slight gap in suture approximation. Dermabond and dressing reapplied and incision clean/dry/intact on POD1. By POD#1, patient weaned to room air. Patient with adequate pain control on PO medication regimen and making all appropriate milestones including ambulating, voiding spontaneously, and tolerating PO without N/V. Discharged home in stable condition with plan for POV Thu Jul 17 at 9:00 AM w/Dr. Kim for routine postoperative care. Patient started on a 14 day course of prophylactic apixiban as well given risk factors.           Pertinent Physical Exam At Time of Discharge  Physical Exam  General: no acute distress  HEENT: normocephalic, atraumatic  CV: warm and well perfused, RRR  Lungs: CTAB on RA, no increased WOB  Abdomen: port sites clean/dry/intact, soft with appropriate post-op tenderness. No rebound or guarding   Extremities: moving all extremities spontaneously  Neuro: awake and conversant  Psych: appropriate mood and affect      Outpatient Follow-Up  Future Appointments   Date Time Provider Department Center   7/17/2025  9:00 AM Susanna Kim MD, MS [id number]         Genie Veliz MD

## 2025-06-30 LAB
LABORATORY COMMENT REPORT: NORMAL
LABORATORY COMMENT REPORT: NORMAL
PATH REPORT.FINAL DX SPEC: NORMAL
PATH REPORT.GROSS SPEC: NORMAL
PATH REPORT.RELEVANT HX SPEC: NORMAL
PATH REPORT.TOTAL CANCER: NORMAL
RESIDENT REVIEW: NORMAL

## 2025-07-03 ENCOUNTER — TELEPHONE (OUTPATIENT)
Dept: GYNECOLOGIC ONCOLOGY | Facility: HOSPITAL | Age: 65
End: 2025-07-03
Payer: COMMERCIAL

## 2025-07-03 NOTE — TELEPHONE ENCOUNTER
Patient called to report bruising around and under right sided abdominal incision.    Today patient states bruising has spread across lower abdomen to right hip.    Patient reports new right hip and flank tenderness this morning.   Denies drainage of right sided incision.    Taking Eliquis 2.5mg bid x 14 days postop.    Denies n/v, fever.  Denies bowel/bladder symptoms.   Denies dizziness, H/A, SOB.   Patient sent pictures of bruising via [employer].   Pictures and update routed to Dr. Kim.    1004  Phoned patient to notify that Dr. Kim reviewed patients pictures sent via Veriv[employer] and recommends stopping Eliquis.   Instructed patient to continue to monitor bruising and incisions and if bruising continues to spread, she develops increased pain, dizziness, SOB to proceed to ER for evaluation.     Patient verbalized her understanding of information given.

## 2025-07-04 NOTE — TELEPHONE ENCOUNTER
Spoke with patient after she paged the on call service. Reports that she has had bleeding from the right lower incision today. Had previously notified the office about bruising on that side of her abdomen and was instructed to stop taking Eliquis. Bleeding started before paging and continued around a band-aid. Denies lightheadedness/dizziness. Endorses some discomfort in the area of the incision. Instructed to apply a pressure bandage over this incision. If she still notes bleeding with pressure bandage, instructed to report to ER for evaluation. Patient in agreement.

## 2025-07-07 ENCOUNTER — TELEPHONE (OUTPATIENT)
Dept: GYNECOLOGIC ONCOLOGY | Facility: HOSPITAL | Age: 65
End: 2025-07-07
Payer: COMMERCIAL

## 2025-07-07 NOTE — TELEPHONE ENCOUNTER
Patient called to report increased redness/pain around right abdominal incision site.    + small amount of bloody drainage from incision yesterday.  Patient states drainage has resolved.     Afebrile.    S/p TLH/BSO on 6/27/25.     Patient scheduled to see Christel Matthews CNP on 7/8/25 at 10am at [facility].   Patient verbalized her understanding of information given.    Message routed to Christel to update.

## 2025-07-08 ENCOUNTER — OFFICE VISIT (OUTPATIENT)
Dept: GYNECOLOGIC ONCOLOGY | Facility: CLINIC | Age: 65
End: 2025-07-08
Payer: COMMERCIAL

## 2025-07-08 VITALS
DIASTOLIC BLOOD PRESSURE: 82 MMHG | TEMPERATURE: 97.2 F | SYSTOLIC BLOOD PRESSURE: 138 MMHG | WEIGHT: 214.62 LBS | BODY MASS INDEX: 31.79 KG/M2 | HEIGHT: 69 IN | OXYGEN SATURATION: 97 % | RESPIRATION RATE: 16 BRPM | HEART RATE: 76 BPM

## 2025-07-08 DIAGNOSIS — Z98.890 POSTOPERATIVE STATE: ICD-10-CM

## 2025-07-08 DIAGNOSIS — C54.1 ENDOMETRIAL CANCER (MULTI): Primary | ICD-10-CM

## 2025-07-08 PROCEDURE — 99211 OFF/OP EST MAY X REQ PHY/QHP: CPT | Performed by: NURSE PRACTITIONER

## 2025-07-08 PROCEDURE — 1036F TOBACCO NON-USER: CPT | Performed by: NURSE PRACTITIONER

## 2025-07-08 PROCEDURE — 3008F BODY MASS INDEX DOCD: CPT | Performed by: NURSE PRACTITIONER

## 2025-07-08 RX ORDER — DEXTROMETHORPHAN HYDROBROMIDE, GUAIFENESIN 5; 100 MG/5ML; MG/5ML
650 LIQUID ORAL EVERY 8 HOURS PRN
Qty: 30 TABLET | Refills: 0 | Status: SHIPPED | OUTPATIENT
Start: 2025-07-08 | End: 2025-07-18

## 2025-07-08 RX ORDER — IBUPROFEN 600 MG/1
600 TABLET, FILM COATED ORAL EVERY 6 HOURS
Qty: 28 TABLET | Refills: 0 | Status: SHIPPED | OUTPATIENT
Start: 2025-07-08 | End: 2025-07-15

## 2025-07-08 ASSESSMENT — PAIN SCALES - GENERAL: PAINLEVEL_OUTOF10: 5

## 2025-07-08 NOTE — PROGRESS NOTES
Patient ID: Anastasia Lindsey is a 65 y.o. female.  Referring Physician: No referring provider defined for this encounter.  Primary Care Provider: Jorge Camarena MD      Subjective    HPI    HPI:   Anastasia Lindsey is a 65 y.o. 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     She initially back pain and blood in the toilet in 2025. She was sent to follow with urology, and a subsequent CT showed endometrial thickening. She underwent a uterine US and EMB which found endometrial cancer. There has been vaginal bleeding, which has worsened since the biopsy. She also endorses abdominal pain right below the belly button.    25 TLH, BSO, sentinel lymph node mapping and biopsy    Interval History: Recovering well from surgery. Had some bleeding from RLQ lap site over the weekend and was instructed to stop Eliquis. Concerned about the appearance of that incision. Reports no issues eating and drinking. Moving bowels normally.     They deny fever, chills, constipation, diarrhea, vaginal,nausea, vomiting, or any other symptoms other than those listed in the interval history.    PMH:  Medical History[1]  She does have a history of hypertension and hypercholesterolemia.  She has a hx of cardiac ablation () for SVT.      PSH:  Surgical History[2]  She has a history of caesarian section (x2) in & and tubal ligation(). She believes her tubes are still in place. She does have a history of knee surgery as well.    There is also a history of D&C and EMB in  which showed a thickened endometrial lining, but no evidence of cancer. This was done by Dr. Osei before she retired.    OBHx:  The patient is a . She underwent menopause at 57. She used COCs for 5 years from 4467-6368. She did not use HRT.    Social:  They deny alcohol, tobacco, and recreational drug use. The patient lives at home with her . The patient works as a  for [employer]  "service for medical [place].     FamHx:  Her mother had renal cell carcinoma, passed at 72. Also a history of heart disease.  Her father had non-hodgkin lymphoma. Also a hx of CAD and CKD, passed at age 80.  Paternal grandparents both passed of GI cancers, young, Unsure of the type of cancer.  Maternal aunt had a history of breast cancer.    Their history is otherwise negative for a history of breast, ovarian, uterine, colon, pancreatic, and GI cancer.     Screening:  Cervical cancer: UTD with pap smears, no hx of abnormal pap smears  Mammogram:  UTD, Jan 2025  Colonoscopy: Has had 7 colonoscopies, polyp removal with each procedure but no evidence of cancer.    Review of Systems - Oncology     Objective   BSA: 2.17 meters squared  /82 (BP Location: Left arm, Patient Position: Sitting, BP Cuff Size: Large adult)   Pulse 76   Temp 36.2 °C (97.2 °F) (Temporal)   Resp 16   Ht (S) 1.747 m (5' 8.78\")   Wt 97.3 kg (214 lb 9.9 oz)   SpO2 97%   BMI 31.90 kg/m²      Family History[3]    Anastasia Lindsey  reports that she has never smoked. She has never used smokeless tobacco.  She  reports no history of alcohol use.  She  reports no history of drug use.    Physical Exam  Constitutional:       General: She is not in acute distress.     Appearance: Normal appearance. She is normal weight. She is not diaphoretic.   HENT:      Head: Normocephalic and atraumatic.      Mouth/Throat:      Mouth: Mucous membranes are moist.   Eyes:      General: No scleral icterus.     Pupils: Pupils are equal, round, and reactive to light.   Cardiovascular:      Rate and Rhythm: Normal rate and regular rhythm.      Pulses: Normal pulses.      Heart sounds: Normal heart sounds. No murmur heard.     No friction rub. No gallop.   Pulmonary:      Effort: Pulmonary effort is normal. No respiratory distress.      Breath sounds: Normal breath sounds. No stridor. No wheezing.   Abdominal:      General: Abdomen is flat. Bowel sounds are normal. "      Palpations: Abdomen is soft.      Tenderness: There is no guarding or rebound.      Comments: Lap sites healing well with no surrounding erythema or induration. RLQ lap site with small scab, no purulent drainage or bleeding noted. Steri strip applied. Scattered bruising across lower abdomen, primarily on right side.    Musculoskeletal:         General: No swelling.   Skin:     General: Skin is warm and dry.      Coloration: Skin is not jaundiced or pale.   Neurological:      Mental Status: She is alert.   Psychiatric:         Mood and Affect: Mood normal.         Behavior: Behavior normal.         Performance Status:  Symptomatic; fully ambulatory    CT 4/19/25 Impression: Thickened endometrium.  Recommend follow-up sonogram     TVUS 4/26/25 showed an area of increased echogenicity of the low anterior body, likely   scarring from prior D&C.   -Size: 10.5 x 3.6 x 5.5 cm   -Orientation: Anteverted   -Endometrial echo complex: Evaluation of the endometrium was adequate.   The endometrial echo complex is thickened measuring 1.3 cm with increased   vascularity   Impression: Postmenopausal endometrial thickening with increased vascularity.   Recommend gynecologic consultation.     Biopsy results: Endometrioid carcinoma, preliminary FIGO grade 2-3, with squamous differentiation. Normal p53 and mismatch repair proteins.    Oncology History    No history exists.         Assessment/Plan      Anastasia Lindsey is a 65 y.o. presenting for post op visit s/p TLH, BSO for FIGO grade 2-3 endometrioid endometrial cancer MMRp, p53wt.    # Endometrial cancer  - Discussed the significance of histologic subtype, grade and stage  - Discussed management options including medical, non-surgical, and surgical options.     # Post op  - Recovering well  - Reviewed ongoing restrictions (no lifting more than 10-15lb, nothing per vagina, no soaking)  - No evidence of infection, bruising improved. Continue to hold Eliquis.   - Call office with  worsening symptoms  - Refill Tylenol and Ibuprofen sent to pharmacy      Christel Matthews, APRN-CNP            [1]   Past Medical History:  Diagnosis Date    Arthritis     L hip    Endometrial cancer (Multi)     GERD (gastroesophageal reflux disease)     Hyperlipidemia     Hypertension     Irregular heart beat     SVT ablation in  - no symptoms since    Liver disease     fatty liver    Personal history of other diseases of the circulatory system     History of essential hypertension    Personal history of other endocrine, nutritional and metabolic disease     History of hypercholesterolemia   [2]   Past Surgical History:  Procedure Laterality Date    COLONOSCOPY      DILATION AND CURETTAGE OF UTERUS      HYSTERECTOMY  2025    OTHER SURGICAL HISTORY  10/04/2022    Knee arthroscopy    OTHER SURGICAL HISTORY  10/04/2022    Ablation    OTHER SURGICAL HISTORY  10/04/2022     section    OTHER SURGICAL HISTORY  10/04/2022    Colonoscopy    TUBAL LIGATION  1993   [3]   Family History  Problem Relation Name Age of Onset    Heart disease Mother      Hyperlipidemia Mother      Kidney cancer Mother      Heart disease Father      Heart attack Father      Hyperlipidemia Father      Lymphoma Father      Heart attack Brother      Hyperlipidemia Brother      Osteoporosis Maternal Grandmother      Colon cancer Paternal Grandfather      Stomach cancer Paternal Grandfather      Breast cancer Mother's Sister      Breast cancer Maternal Cousin  50    Cancer Mother Cheryl Zuniga     Cancer Father Jorge

## 2025-07-16 NOTE — PROGRESS NOTES
Patient ID: Anastasia Lindsey is a 65 y.o. female.  Referring Physician: No referring provider defined for this encounter.  Primary Care Provider: Jorge Camarena MD      Subjective    HPI    HPI:   Anastasia Lindsey is a 65 y.o. 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     She initially back pain and blood in the toilet in 2025. She was sent to follow with urology, and a subsequent CT showed endometrial thickening. She underwent a uterine US and EMB which found endometrial cancer. There has been vaginal bleeding, which has worsened since the biopsy. She also endorses abdominal pain right below the belly button.    25 TLH, BSO, sentinel lymph node mapping and biopsy    Interval History:  She is taking Advil at night, recovering well, no bowel issues, bladder function is improving. Anastasia's incisions are healing normally.    They deny fever, chills, constipation, diarrhea, vaginal,nausea, vomiting, or any other symptoms other than those listed in the interval history.    PMH:  Medical History[1]  She does have a history of hypertension and hypercholesterolemia.  She has a hx of cardiac ablation () for SVT.      PSH:  Surgical History[2]  She has a history of caesarian section (x2) in & and tubal ligation(). She believes her tubes are still in place. She does have a history of knee surgery as well.    There is also a history of D&C and EMB in  which showed a thickened endometrial lining, but no evidence of cancer. This was done by Dr. Osei before she retired.    OBHx:  The patient is a . She underwent menopause at 57. She used COCs for 5 years from 5494-6091. She did not use HRT.    Social:  They deny alcohol, tobacco, and recreational drug use. The patient lives at home with her . 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     FamHx:  Her mother had renal cell carcinoma, passed at 72.  Also a history of heart disease.  Her father had non-hodgkin lymphoma. Also a hx of CAD and CKD, passed at age 80.  Paternal grandparents both passed of GI cancers, young, Unsure of the type of cancer.  Maternal aunt had a history of breast cancer.    Their history is otherwise negative for a history of breast, ovarian, uterine, colon, pancreatic, and GI cancer.     Screening:  Cervical cancer: UTD with pap smears, no hx of abnormal pap smears  Mammogram:  UTD, Jan 2025  Colonoscopy: Has had 7 colonoscopies, polyp removal with each procedure but no evidence of cancer.    Review of Systems - Oncology     Objective   BSA: 2.17 meters squared  /81 (BP Location: Right arm, Patient Position: Sitting, BP Cuff Size: Adult)   Pulse 78   Temp 36.1 °C (96.9 °F) (Core)   Resp 20   Wt 96.6 kg (213 lb)   SpO2 98%   BMI 31.66 kg/m²      Family History[3]    Anastasia Lindsey  reports that she has never smoked. She has never used smokeless tobacco.  She  reports no history of alcohol use.  She  reports no history of drug use.    Physical Exam  Constitutional:       General: She is not in acute distress.     Appearance: Normal appearance. She is normal weight. She is not diaphoretic.   HENT:      Head: Normocephalic and atraumatic.      Mouth/Throat:      Mouth: Mucous membranes are moist.     Eyes:      General: No scleral icterus.     Pupils: Pupils are equal, round, and reactive to light.       Cardiovascular:      Rate and Rhythm: Normal rate and regular rhythm.      Pulses: Normal pulses.      Heart sounds: Normal heart sounds. No murmur heard.     No friction rub. No gallop.   Pulmonary:      Effort: Pulmonary effort is normal. No respiratory distress.      Breath sounds: Normal breath sounds. No stridor. No wheezing.   Abdominal:      General: Abdomen is flat. Bowel sounds are normal.      Palpations: Abdomen is soft.      Tenderness: There is no guarding or rebound.      Comments: Lap sites healing well with no  surrounding erythema or induration.      Musculoskeletal:         General: No swelling.     Skin:     General: Skin is warm and dry.      Coloration: Skin is not jaundiced or pale.     Neurological:      Mental Status: She is alert.     Psychiatric:         Mood and Affect: Mood normal.         Behavior: Behavior normal.         Performance Status:  Symptomatic; fully ambulatory      Oncology History    No history exists.         Assessment/Plan      Anastasia Lindsey is a 65 y.o. presenting for post op visit s/p TLH, BSO, SLN on 6/27/25 for FIGO grade 2-3 endometrioid endometrial cancer MMRp, p53wt.    # Endometrial cancer  - Discussed the significance of histologic subtype, grade and stage  - Discussed management options including medical, non-surgical, and surgical options.   - pathology pending, will call when returns   - Follow-up/adjuvant therapy dependent on path    # Post op  - Recovering well  - Reviewed ongoing restrictions (no lifting more than 10-15lb, nothing per vagina, no soaking)  - Call office with worsening symptoms  - Refill Tylenol and Ibuprofen sent to pharmacy      Scribe Attestation  By signing my name below, I, Oziel Duranibe   attest that this documentation has been prepared under the direction and in the presence of Susanna Kim MD, MS.       Provider Attestation - Scribe documentation    All medical record entries made by the Scribe were at my direction and personally dictated by me. I have reviewed the chart and agree that the record accurately reflects my personal performance of the history, physical exam, discussion and plan.    Susanna Kim MD, MS        [1]   Past Medical History:  Diagnosis Date    Arthritis     L hip    Endometrial cancer (Multi) O5/2025    GERD (gastroesophageal reflux disease) 2015    Hyperlipidemia     Hypertension 2015    Irregular heart beat     SVT ablation in 2014 - no symptoms since    Liver disease     fatty liver    Personal history of  other diseases of the circulatory system     History of essential hypertension    Personal history of other endocrine, nutritional and metabolic disease     History of hypercholesterolemia   [2]   Past Surgical History:  Procedure Laterality Date    COLONOSCOPY      DILATION AND CURETTAGE OF UTERUS      HYSTERECTOMY  2025    LAPAROSCOPIC HYSTERECTOMY  2025    TLH/BSO/SLN    OTHER SURGICAL HISTORY  10/04/2022    Knee arthroscopy    OTHER SURGICAL HISTORY  10/04/2022    Ablation    OTHER SURGICAL HISTORY  10/04/2022     section    OTHER SURGICAL HISTORY  10/04/2022    Colonoscopy    TUBAL LIGATION  1993   [3]   Family History  Problem Relation Name Age of Onset    Heart disease Mother      Hyperlipidemia Mother      Kidney cancer Mother      Heart disease Father      Heart attack Father      Hyperlipidemia Father      Lymphoma Father      Heart attack Brother      Hyperlipidemia Brother      Osteoporosis Maternal Grandmother      Colon cancer Paternal Grandfather      Stomach cancer Paternal Grandfather      Breast cancer Mother's Sister      Breast cancer Maternal Cousin  50    Cancer Mother Cheryl Zuniga     Cancer Father Jorge

## 2025-07-17 ENCOUNTER — OFFICE VISIT (OUTPATIENT)
Dept: GYNECOLOGIC ONCOLOGY | Facility: CLINIC | Age: 65
End: 2025-07-17
Payer: COMMERCIAL

## 2025-07-17 VITALS
HEART RATE: 78 BPM | TEMPERATURE: 96.9 F | WEIGHT: 213 LBS | DIASTOLIC BLOOD PRESSURE: 81 MMHG | SYSTOLIC BLOOD PRESSURE: 132 MMHG | OXYGEN SATURATION: 98 % | RESPIRATION RATE: 20 BRPM | BODY MASS INDEX: 31.66 KG/M2

## 2025-07-17 DIAGNOSIS — Z90.721 S/P HYSTERECTOMY WITH OOPHORECTOMY: ICD-10-CM

## 2025-07-17 DIAGNOSIS — C54.1 ENDOMETRIAL ADENOCARCINOMA (MULTI): Primary | ICD-10-CM

## 2025-07-17 DIAGNOSIS — Z90.710 S/P HYSTERECTOMY WITH OOPHORECTOMY: ICD-10-CM

## 2025-07-17 PROCEDURE — 99211 OFF/OP EST MAY X REQ PHY/QHP: CPT | Performed by: STUDENT IN AN ORGANIZED HEALTH CARE EDUCATION/TRAINING PROGRAM

## 2025-07-17 PROCEDURE — 1036F TOBACCO NON-USER: CPT | Performed by: STUDENT IN AN ORGANIZED HEALTH CARE EDUCATION/TRAINING PROGRAM

## 2025-07-17 ASSESSMENT — PAIN SCALES - GENERAL: PAINLEVEL_OUTOF10: 0-NO PAIN

## 2025-07-18 LAB
LAB AP BLOCK FOR ADDITIONAL STUDIES: NORMAL
LABORATORY COMMENT REPORT: NORMAL
PATH REPORT.FINAL DX SPEC: NORMAL
PATH REPORT.GROSS SPEC: NORMAL
PATH REPORT.RELEVANT HX SPEC: NORMAL
PATH REPORT.TOTAL CANCER: NORMAL
PATHOLOGY SYNOPTIC REPORT: NORMAL

## 2025-07-19 DIAGNOSIS — C54.1 ENDOMETRIAL ADENOCARCINOMA (MULTI): Primary | ICD-10-CM

## 2025-07-24 ENCOUNTER — HOSPITAL ENCOUNTER (OUTPATIENT)
Dept: RADIATION ONCOLOGY | Facility: HOSPITAL | Age: 65
Setting detail: RADIATION/ONCOLOGY SERIES
Discharge: HOME | End: 2025-07-24
Payer: COMMERCIAL

## 2025-07-24 VITALS
DIASTOLIC BLOOD PRESSURE: 83 MMHG | SYSTOLIC BLOOD PRESSURE: 132 MMHG | BODY MASS INDEX: 32.24 KG/M2 | OXYGEN SATURATION: 98 % | TEMPERATURE: 98.1 F | RESPIRATION RATE: 18 BRPM | WEIGHT: 216.93 LBS | HEART RATE: 76 BPM

## 2025-07-24 DIAGNOSIS — C54.1 ENDOMETRIAL ADENOCARCINOMA (MULTI): Primary | ICD-10-CM

## 2025-07-24 PROCEDURE — 99215 OFFICE O/P EST HI 40 MIN: CPT | Performed by: RADIOLOGY

## 2025-07-24 PROCEDURE — 99205 OFFICE O/P NEW HI 60 MIN: CPT | Performed by: RADIOLOGY

## 2025-07-24 ASSESSMENT — ENCOUNTER SYMPTOMS
PSYCHIATRIC NEGATIVE: 1
EYES NEGATIVE: 1
FATIGUE: 1
NEUROLOGICAL NEGATIVE: 1
ENDOCRINE NEGATIVE: 1
RESPIRATORY NEGATIVE: 1
GASTROINTESTINAL NEGATIVE: 1
HEMATOLOGIC/LYMPHATIC NEGATIVE: 1
CARDIOVASCULAR NEGATIVE: 1
MUSCULOSKELETAL NEGATIVE: 1

## 2025-07-24 ASSESSMENT — COLUMBIA-SUICIDE SEVERITY RATING SCALE - C-SSRS
1. IN THE PAST MONTH, HAVE YOU WISHED YOU WERE DEAD OR WISHED YOU COULD GO TO SLEEP AND NOT WAKE UP?: NO
6. HAVE YOU EVER DONE ANYTHING, STARTED TO DO ANYTHING, OR PREPARED TO DO ANYTHING TO END YOUR LIFE?: NO
2. HAVE YOU ACTUALLY HAD ANY THOUGHTS OF KILLING YOURSELF?: NO

## 2025-07-24 ASSESSMENT — PATIENT HEALTH QUESTIONNAIRE - PHQ9
2. FEELING DOWN, DEPRESSED OR HOPELESS: NOT AT ALL
1. LITTLE INTEREST OR PLEASURE IN DOING THINGS: NOT AT ALL
SUM OF ALL RESPONSES TO PHQ9 QUESTIONS 1 AND 2: 0

## 2025-07-24 ASSESSMENT — PAIN SCALES - GENERAL: PAINLEVEL_OUTOF10: 0-NO PAIN

## 2025-07-24 NOTE — PROGRESS NOTES
Radiation Oncology Nursing Note    Prior Radiotherapy:  No  No radiation treatments to show. (Treatments may have been administered in another system.)     Current Systemic Treatment:  No     Presence of Pacemaker or ICD:  No    History of Autoimmune or Connective Tissue Disorders:  No    Pain: The patient's current pain level was assessed.  They report currently having a pain of 0 out of 10.  They feel their pain is under control without the use of pain medications.    Review of Systems:  Review of Systems   Constitutional:  Positive for fatigue (mild; pt. continues to work).   HENT:  Negative.     Eyes: Negative.    Respiratory: Negative.     Cardiovascular: Negative.    Gastrointestinal: Negative.    Endocrine: Negative.    Genitourinary:  Negative for vaginal bleeding and vaginal discharge.         Urinary urgency since hysterectomy, improved but still there   Musculoskeletal: Negative.    Skin: Negative.    Neurological: Negative.    Hematological: Negative.    Psychiatric/Behavioral: Negative.        Patient here alone to discuss brachytherapy for endometrial cancer.  Patient recovering well post hysterectomy on 6/24/25.

## 2025-07-24 NOTE — PROGRESS NOTES
Radiation Oncology Outpatient Consult    Patient Name:  Anastasia Lindsey  MRN:  [id number]  :  1960    Referring Provider: Susanna Kim MD*  Primary Care Provider: Jorge Camarena MD  Care Team: Patient Care Team:  Jorge ELIZABETH MD as PCP - General  Jorge ELIZABETH MD as PCP - MMO ACO PCP  Susanna Kim MD, MS as Consulting Physician (Obstetrics and Gynecology)  DANIELLA Rivera as  ()  Carmela Bautista MD as Fellow (Obstetrics and Gynecology)    Date of Service: 2025     History of Present Illness:  Anastasia Lindsey is a 65 y.o. female who was referred by Susanna Kim MD*, for a consultation to the [facility] Department of Radiation Oncology.  She is presenting for evaluation and management of Endometrial adenocarcinoma (Multi), Clinical: FIGO Stage IIB, calculated as Stage IA (cT1a, cN0(sn), cM0, MMRd-, p53-).     Her oncological work up and treatment history is as below:  Post-menopausal vaginal bleeding and ongoing urinary symptoms.  Evaluated through Urology.  CT showed endometrial thickening confimed with vaginal US.   EMB by Dr. Lockett 2025 with findings of Grade 2-3 endometrial carcinoma with squamous differentiation.  S/b Dr. Kim.  2025 Total laparoscopic hysterectomy, bilateral salpingo-oophorectomy, [place] lymph node mapping and biospy   Presents to discuss role of adjuvant radiation.  No unusual post-operative complications.    Pathology Review:  The pertinent pathology results were reviewed from EMR and discussed with the patient.       FINAL DIAGNOSIS 2025   A. ENDOMETRIUM, BIOPSY:    -- Endometrioid carcinoma, preliminary FIGO grade 2-3, with squamous differentiation    TP53   INTERPRETATION: Normal (wild-type)  (PATTERN: patchy nuclear staining)         MISMATCH REPAIR PROTEIN EXPRESSION   INTERPRETATION: Endometrial carcinoma with normal mismatch repair protein  "expression.  TEST RESULTS:  PROTEIN          RESULT              MLH-1 :              POSITIVE                                        PMS-2 :             POSITIVE                                        MSH-2 :             POSITIVE                                      MSH-6 :             POSITIVE             FINAL DIAGNOSIS 06/24/2025   A. Left pelvic sentinel lymph node, excision:  - Two lymph nodes, negative for metastatic carcinoma (0/2).     B. Submitted as \"right pelvic sentinel lymph node\", excision:  - Fibroadipose tissue; no lymphoid tissue identified.     C. Uterus, cervix, bilateral fallopian tubes and ovaries, hysterectomy and bilateral salpingo-oophorectomy:  - Endometrial carcinoma, endometrioid type, FIGO grade 2, with squamous differentiation, and less than 50% myometrial invasion. See synoptic report.  - Myometrium with adenomyosis (uninvolved by carcinoma).  - Cervix with endocervical polyp and nabothian cysts.  - Bilateral fallopian tubes with paratubal cysts.  - Bilateral ovaries with cortical inclusion cysts.         Case Summary Report   ENDOMETRIUM   AJCC 8 - Protocol posted: 12/11/2024ENDOMETRIUM - All Specimens  SPECIMEN   Procedure  Total hysterectomy     Bilateral salpingo-oophorectomy   Specimen Integrity  Intact   TUMOR   Tumor Size  Greatest gross dimension (Centimeters): 2.2 cm   Additional Dimension (Centimeters)  2.2 cm     1.3 cm   Histologic Type  Endometrioid carcinoma   Histologic Grade  FIGO grade 2   Myometrial Invasion  Present, inner half (less than 50%)   Percentage  46 %   Adenomyosis  Present, uninvolved by carcinoma   Uterine Serosal Involvement  Not identified   Lower Uterine Segment Involvement  Not identified   Cervical Involvement  Not identified   Other Tissue / Organ Involvement  Not identified (other tissues / organs submitted and not involved)   Lymphatic and / or Vascular Invasion  Present     Greater than or equal to 5 foci   MARGINS   Margin Status  All margins " negative for carcinoma   REGIONAL LYMPH NODES   Regional Lymph Node Status  All regional lymph nodes negative for tumor cells   Lymph Nodes Examined     Total Number of Pelvic Nodes Examined  2   Number of Pelvic [place] Nodes Examined  2   Total Number of Para-aortic Nodes Examined  0   pTNM CLASSIFICATION (AJCC 8th Edition)   Reporting of pT, pN, and (when applicable) pM categories is based on information available to the pathologist at the time the report is issued. As per the AJCC (Chapter 1, 8th Ed.) it is the managing physician's responsibility to establish the final pathologic stage based upon all pertinent information, including but potentially not limited to this pathology report.   pT Category  pT1a   pN Category  pN0   N Suffix  (sn)   FIGO STAGE   FIGO Stage (FIGO 2009 Staging / 2018 FIGO Cancer Report)  IA   .        Imaging:  The pertinent imaging results were reviewed from EMR/PACS with key results discussed with the patient.    CT Kidney w and wo con 4/19/2025  Normal kidneys and collecting systems.  No focal mass, calculi or   Obstruction. No focal bladder lesion. Thickened endometrium.     US Transvaginal 4/26/2025  Postmenopausal endometrial thickening with increased vascularity.       CT CHEST ABDOMEN PELVIS W IV CONTRAST; 6/16/2025   1. The uterine mass lies in close proximity to the sigmoid colon,  with loss of the normal fat plane; proximal and distal to this area  is a long segment of wall thickening/edema, suspicious for early  invasion of the sigmoid colon.  2. Pulmonary nodules measuring up to 5 mm, for which short-term  follow-up CT in 8-12 weeks is recommended.  3. No pelvic/para-aortic lymphadenopathy. No peritoneal  carcinomatosis or hepatic metastasis. No evidence of skip metastasis.  4. Masslike thickening of the uterus, consistent with patient's  biopsy-proven neoplasm. Thickening and distention of the left  fallopian tube extending to the left ovary is noted, suspicious  for  trans-tubal spread of disease to the ovary.    Review of Systems: See separately signed RN note.  Ongoing urinary urgency and feeling of pelvic discomfort. This is gradually improving. No urinary burning.    RADIATION SCREENING QUESTIONS:  Prior radiation therapy: No  Pacemaker: No  Other implantable devices: No  Connective tissue disease: No    Current Systemic Treatment:  No     Past Medical History:  Medical History[1]  Past Surgical History:  Surgical History[2]   Family History:  Cancer-related family history includes Breast cancer in her mother's sister; Breast cancer (age of onset: 50) in her maternal cousin; Cancer in her father and mother; Colon cancer in her paternal grandfather; Kidney cancer in her mother; Lymphoma in her father; Stomach cancer in her paternal grandfather.  Social History:  Social History[3]  GYN History:   Allergies:  Allergies[4]  Medications:  Current Medications[5]      Performance Status:  The Karnofsky performance scale today is 90, Able to carry on normal activity; minor signs or symptoms of disease (ECOG equivalent 0).     OBJECTIVE  Physical Exam:  /83   Pulse 76   Temp 36.7 °C (98.1 °F) (Skin)   Resp 18   Wt 98.4 kg (216 lb 14.9 oz)   SpO2 98%   BMI 32.24 kg/m²    Physical Exam  Constitutional:       General: She is not in acute distress.     Appearance: She is not ill-appearing or toxic-appearing.     Eyes:      General: No scleral icterus.    Pulmonary:      Effort: No respiratory distress.      Breath sounds: No wheezing.   Abdominal:      General: There is no distension.      Tenderness: There is no abdominal tenderness.      Comments: Well healing surgical scar.   Genitourinary:     Comments: Age appropriate external genitalia. No bleeding or discharge at introitus.  No inguinal adenopathy.  No radiation dermatitis.  On speculum examination, distal vagina/vaginal introitus appears unremarkable. There is mild blood stained discharge in upper vagina. Further  examination aborted.  Limited digital examination of distal vaginal suggests feasibility of using a 3 cm vaginal cylinder size.      Musculoskeletal:      Right lower leg: No edema.      Left lower leg: No edema.   Lymphadenopathy:      Cervical: No cervical adenopathy.     Neurological:      General: No focal deficit present.      Mental Status: She is alert and oriented to person, place, and time.      Motor: No weakness.      Gait: Gait normal.     Psychiatric:         Mood and Affect: Mood normal.         Behavior: Behavior normal.          Laboratory Review:  The pertinent lab results were reviewed and discussed with the patient.      ASSESSMENT:  Anastasia Lindsey is a 65 y.o. female with a newly diagnosis of endometrial adenocarcinoma, FIGO stage IA (2009)/IIB (2023), Grade 2 (2-3 in initial EMB), Myometrial invasion 46%, substantial LVSI, negative margins, 0/2 pelvic, 0/0 PA nodes sampled, p53wt, MMRp. She is s/p TH/BSO/SLN done on 6/27/2025 who is recovering well from the surgery. Detailed vaginal examination couldn't be done since still healing.   She presents to discuss role of radiation therapy, specifically with vaginal brachytherapy (VBT) alone or in combination with whole pelvic external radiotherapy (WPRT) versus surveillance.    PLAN:    Ms. Lindsey's pertinent history, exam, imaging and pathology details were reviewed.   Accompanied by herself.    Treatment recommendations/Alternatives:  NCCN Guidelines were applicable to guide this patients treatment plan.     We reviewed the standard of care management for her known diagnosis of endometrial cancer FIGOstage IA (2009)/IIB (2023), Grade 2 (2-3 in initial EMB).     We discussed updated FIGO staging (2023) and [facility] clinical practice guidelines, specifically on the importance of substantial LVSI which upstages her to stage IIB. The implication of this in medical decision making recognizing that much of the clinical evidence on treatment decision between  VBT vs WPRT was prior to this staging change. We reviewed data from studies with regards to differences in control rates between VBT vs WPRT, in context of significant increase in risk of toxicity for relatively small incremental gain. I compared and contrast VBT versus WPRT in terms of treatment goals, logistics, treatment planning and side-effects.      Further she is older than 60 years and has 46% MMI (almost reaching 50%).    Overall these factors place her at a higher risk of recurrence. We reviewed patterns of recurrence including vaginal cuff, pelvic felicia and distant.    Based on extensive discussion, she has elected to proceed with VBT.     Radiation treatment logistics:  We then focused our attention regarding logistics, rationale and potential risks with VBT.   VBT can be performed as 3-5 separate out-patient procedures. Given her young age, to reduce risk of vaginal, bladder and rectal toxicity, I have recommended a 5 fraction approach.    No sedation or anesthesia will be needed. Topical lidocaine application will be used as needed. An initial simulation/mapping that will involve a planning CT scan with the treatment device will be done as a stand-alone appointment or in combination with the first treatment session. The subsequent treatments will be scheduled directly as treatment sessions.     We will treat her with comfortably full bladder. I reviewed instructions for bowel routine to allow empty rectum prior to the simulation and for each treatments. She was encouraged to use OTC stool softeners/ laxatives as needed.    We then reviewed possible side effects (Acute and long-term). Common and uncommon side effects with risks as relevant for her case were discussed.     Overall I expect vaginal brachytherapy to be very well-tolerated without any significant significant without any relevant pelvic or cutaneous symptoms of external beam radiation therapy. Risk of vaginal stenosis and impact on sexual  function reviewed. She will be given a dilatory post-treatment.    After detailed discussion of risks/benefits/goals/alternatives, patient signed the informed consent.  CT simulation and treatments will be arranged at the earliest once insurance approval is obtained.  A repeat examination is done to ensure cuff healing prior to treatment initiation.    Orders placed:  1) Radonc intent to treat: Brachytherapy    Network location: The patient will be treated at the [facility] location. [place] [facility] [place]    Pain Management:  No acute needs    Social Work:  No acute needs    Nutrition: No acute needs    LONGITUDINAL CARE, EXTRA EFFORT/ : The patient will be followed longitudinally by providers (including APPs) in the department of radiation oncology for monitoring treatment effects during and after radiation. Additional effort needed in the setting of coordination of care with Gyn Oncology and treatment planning.    The patient was provided my contact information.       Elizabeth Esparza MD, MMM  Senior Attending Physician, [facility]  Professor, [employer]   Our [place]: “To Heal, To Teach, To Discover.”  Phone (after hours): [phone]  RN partner: Parisa Slaughter  Radiation Oncology (scheduling): Carmen Simpson  Proton Therapy (scheduling): Yoli Ponce  Brachytherapy (scheduling): Oliva Castle         [1]   Past Medical History:  Diagnosis Date    Arthritis     L hip    Endometrial cancer (Multi) O5/2025    GERD (gastroesophageal reflux disease) 2015    Hyperlipidemia     Hypertension 2015    Irregular heart beat     SVT ablation in 2014 - no symptoms since    Liver disease     fatty liver    Personal history of other diseases of the circulatory system     History of essential hypertension    Personal history of other endocrine, nutritional and metabolic disease     History of hypercholesterolemia   [2]   Past Surgical  History:  Procedure Laterality Date    COLONOSCOPY      DILATION AND CURETTAGE OF UTERUS      HYSTERECTOMY  2025    LAPAROSCOPIC HYSTERECTOMY  2025    TLH/BSO/SLN    OTHER SURGICAL HISTORY  10/04/2022    Knee arthroscopy    OTHER SURGICAL HISTORY  10/04/2022    Ablation    OTHER SURGICAL HISTORY  10/04/2022     section    OTHER SURGICAL HISTORY  10/04/2022    Colonoscopy    TUBAL LIGATION  1993   [3]   Social History  Tobacco Use    Smoking status: Never    Smokeless tobacco: Never   Vaping Use    Vaping status: Never Used   Substance Use Topics    Alcohol use: Never    Drug use: Never   [4]   Allergies  Allergen Reactions    Tetracyclines Hives and Unknown    Atorvastatin Other     Joint pain    Naproxen GI Upset    Betadine [Povidone-Iodine] Rash   [5]   Current Outpatient Medications:     aspirin 81 mg EC tablet, Take 1 tablet (81 mg) by mouth once a day on Monday, Wednesday, and Friday., Disp: , Rfl:     ezetimibe (Zetia) 10 mg tablet, Take 1 tablet (10 mg) by mouth once daily., Disp: , Rfl:     fish oil concentrate (Fish OiL) 120-180 mg capsule, Take by mouth once daily., Disp: , Rfl:     multivitamin tablet, Take 1 tablet by mouth once daily., Disp: , Rfl:     rosuvastatin (Crestor) 10 mg tablet, Take 1 tablet (10 mg) by mouth once daily at bedtime., Disp: , Rfl:     triamterene-hydrochlorothiazid (Maxzide-25) 37.5-25 mg tablet, Take 0.5 tablets by mouth once daily., Disp: , Rfl:     turmeric-turmeric root extract 450-50 mg capsule, Take by mouth., Disp: , Rfl:     clobetasol (Temovate) 0.05 % ointment, Apply topically 2 times a day. (Patient not taking: Reported on 2025), Disp: , Rfl:     clotrimazole-betamethasone (Lotrisone) cream, Apply topically 2 times a day. (Patient not taking: Reported on 2025), Disp: , Rfl:

## 2025-07-25 ENCOUNTER — APPOINTMENT (OUTPATIENT)
Dept: RADIATION ONCOLOGY | Facility: HOSPITAL | Age: 65
End: 2025-07-25
Payer: COMMERCIAL

## 2025-07-25 PROCEDURE — 77263 THER RADIOLOGY TX PLNG CPLX: CPT | Performed by: RADIOLOGY

## 2025-07-25 PROCEDURE — 77470 SPECIAL RADIATION TREATMENT: CPT | Performed by: RADIOLOGY

## 2025-08-04 ENCOUNTER — TELEPHONE (OUTPATIENT)
Dept: RADIATION ONCOLOGY | Facility: HOSPITAL | Age: 65
End: 2025-08-04

## 2025-08-08 ENCOUNTER — TELEPHONE (OUTPATIENT)
Dept: RADIATION ONCOLOGY | Facility: HOSPITAL | Age: 65
End: 2025-08-08
Payer: COMMERCIAL

## 2025-08-08 NOTE — TELEPHONE ENCOUNTER
Called patient and let her know I sent her FMLA on 7/29/25.  Also, answered all her questions regarding brachytherapy.

## 2025-08-19 ENCOUNTER — TELEPHONE (OUTPATIENT)
Dept: RADIATION ONCOLOGY | Facility: HOSPITAL | Age: 65
End: 2025-08-19
Payer: COMMERCIAL

## 2025-08-21 ENCOUNTER — APPOINTMENT (OUTPATIENT)
Dept: RADIATION ONCOLOGY | Facility: HOSPITAL | Age: 65
End: 2025-08-21
Payer: COMMERCIAL

## 2025-08-21 ENCOUNTER — HOSPITAL ENCOUNTER (OUTPATIENT)
Dept: RADIOLOGY | Facility: EXTERNAL LOCATION | Age: 65
Discharge: HOME | End: 2025-08-21
Payer: COMMERCIAL

## 2025-08-21 DIAGNOSIS — C54.1 MALIGNANT NEOPLASM OF ENDOMETRIUM (MULTI): Primary | ICD-10-CM

## 2025-08-21 DIAGNOSIS — C54.1 MALIGNANT NEOPLASM OF ENDOMETRIUM (MULTI): ICD-10-CM

## 2025-08-21 PROCEDURE — 77295 3-D RADIOTHERAPY PLAN: CPT | Performed by: RADIOLOGY

## 2025-08-21 PROCEDURE — 77770 HDR RDNCL NTRSTL/ICAV BRCHTX: CPT | Performed by: RADIOLOGY

## 2025-08-21 PROCEDURE — 77012 CT SCAN FOR NEEDLE BIOPSY: CPT | Performed by: RADIOLOGY

## 2025-08-21 PROCEDURE — 77012 CT SCAN FOR NEEDLE BIOPSY: CPT | Mod: 59 | Performed by: RADIOLOGY

## 2025-08-21 PROCEDURE — 77290 THER RAD SIMULAJ FIELD CPLX: CPT | Performed by: RADIOLOGY

## 2025-08-21 PROCEDURE — C1717 BRACHYTX, NON-STR,HDR IR-192: HCPCS | Performed by: RADIOLOGY

## 2025-08-21 PROCEDURE — 77332 RADIATION TREATMENT AID(S): CPT | Performed by: RADIOLOGY

## 2025-08-21 PROCEDURE — 77280 THER RAD SIMULAJ FIELD SMPL: CPT | Mod: 59 | Performed by: RADIOLOGY

## 2025-08-21 PROCEDURE — 57156 INS VAG BRACHYTX DEVICE: CPT | Performed by: RADIOLOGY

## 2025-08-21 PROCEDURE — 77370 RADIATION PHYSICS CONSULT: CPT | Mod: 59 | Performed by: RADIOLOGY

## 2025-08-21 PROCEDURE — 77280 THER RAD SIMULAJ FIELD SMPL: CPT | Performed by: RADIOLOGY

## 2025-08-25 ENCOUNTER — HOSPITAL ENCOUNTER (OUTPATIENT)
Dept: RADIATION ONCOLOGY | Facility: HOSPITAL | Age: 65
Setting detail: RADIATION/ONCOLOGY SERIES
Discharge: HOME | End: 2025-08-25
Payer: COMMERCIAL

## 2025-08-25 DIAGNOSIS — C54.1 MALIGNANT NEOPLASM OF ENDOMETRIUM (MULTI): ICD-10-CM

## 2025-08-25 LAB
RAD ONC MSQ ACTUAL FRACTIONS DELIVERED: 2
RAD ONC MSQ ACTUAL SESSION DELIVERED DOSE: 600 CGRAY
RAD ONC MSQ ACTUAL TOTAL DOSE: 1200 CGRAY
RAD ONC MSQ ELAPSED DAYS: 4
RAD ONC MSQ LAST DATE: NORMAL
RAD ONC MSQ PRESCRIBED FRACTIONAL DOSE: 600 CGRAY
RAD ONC MSQ PRESCRIBED NUMBER OF FRACTIONS: 5
RAD ONC MSQ PRESCRIBED TECHNIQUE: NORMAL
RAD ONC MSQ PRESCRIBED TOTAL DOSE: 3000 CGRAY
RAD ONC MSQ PRESCRIPTION PATTERN COMMENT: NORMAL
RAD ONC MSQ START DATE: NORMAL
RAD ONC MSQ TREATMENT COURSE NUMBER: 1
RAD ONC MSQ TREATMENT SITE: NORMAL

## 2025-08-25 PROCEDURE — 77770 HDR RDNCL NTRSTL/ICAV BRCHTX: CPT | Performed by: RADIOLOGY

## 2025-08-25 PROCEDURE — 77280 THER RAD SIMULAJ FIELD SMPL: CPT | Performed by: RADIOLOGY

## 2025-08-25 PROCEDURE — 57156 INS VAG BRACHYTX DEVICE: CPT | Performed by: RADIOLOGY

## 2025-08-25 PROCEDURE — C1717 BRACHYTX, NON-STR,HDR IR-192: HCPCS | Performed by: RADIOLOGY

## 2025-08-26 DIAGNOSIS — C54.1 ENDOMETRIAL ADENOCARCINOMA (MULTI): ICD-10-CM

## 2025-08-28 ENCOUNTER — HOSPITAL ENCOUNTER (OUTPATIENT)
Dept: RADIATION ONCOLOGY | Facility: HOSPITAL | Age: 65
Setting detail: RADIATION/ONCOLOGY SERIES
Discharge: HOME | End: 2025-08-28
Payer: COMMERCIAL

## 2025-08-28 DIAGNOSIS — C54.1 MALIGNANT NEOPLASM OF ENDOMETRIUM (MULTI): ICD-10-CM

## 2025-08-28 LAB
RAD ONC MSQ ACTUAL FRACTIONS DELIVERED: 3
RAD ONC MSQ ACTUAL SESSION DELIVERED DOSE: 600 CGRAY
RAD ONC MSQ ACTUAL TOTAL DOSE: 1800 CGRAY
RAD ONC MSQ ELAPSED DAYS: 7
RAD ONC MSQ LAST DATE: NORMAL
RAD ONC MSQ PRESCRIBED FRACTIONAL DOSE: 600 CGRAY
RAD ONC MSQ PRESCRIBED NUMBER OF FRACTIONS: 5
RAD ONC MSQ PRESCRIBED TECHNIQUE: NORMAL
RAD ONC MSQ PRESCRIBED TOTAL DOSE: 3000 CGRAY
RAD ONC MSQ PRESCRIPTION PATTERN COMMENT: NORMAL
RAD ONC MSQ START DATE: NORMAL
RAD ONC MSQ TREATMENT COURSE NUMBER: 1
RAD ONC MSQ TREATMENT SITE: NORMAL

## 2025-08-28 PROCEDURE — 77770 HDR RDNCL NTRSTL/ICAV BRCHTX: CPT | Performed by: RADIOLOGY

## 2025-08-28 PROCEDURE — 77280 THER RAD SIMULAJ FIELD SMPL: CPT | Performed by: RADIOLOGY

## 2025-08-28 PROCEDURE — 57156 INS VAG BRACHYTX DEVICE: CPT | Performed by: RADIOLOGY

## 2025-08-28 PROCEDURE — C1717 BRACHYTX, NON-STR,HDR IR-192: HCPCS | Performed by: RADIOLOGY

## 2025-09-02 ENCOUNTER — HOSPITAL ENCOUNTER (OUTPATIENT)
Dept: RADIATION ONCOLOGY | Facility: HOSPITAL | Age: 65
Setting detail: RADIATION/ONCOLOGY SERIES
Discharge: HOME | End: 2025-09-02
Payer: COMMERCIAL

## 2025-09-02 DIAGNOSIS — C54.1 MALIGNANT NEOPLASM OF ENDOMETRIUM (MULTI): ICD-10-CM

## 2025-09-02 LAB
RAD ONC MSQ ACTUAL FRACTIONS DELIVERED: 4
RAD ONC MSQ ACTUAL SESSION DELIVERED DOSE: 600 CGRAY
RAD ONC MSQ ACTUAL TOTAL DOSE: 2400 CGRAY
RAD ONC MSQ ELAPSED DAYS: 12
RAD ONC MSQ LAST DATE: NORMAL
RAD ONC MSQ PRESCRIBED FRACTIONAL DOSE: 600 CGRAY
RAD ONC MSQ PRESCRIBED NUMBER OF FRACTIONS: 5
RAD ONC MSQ PRESCRIBED TECHNIQUE: NORMAL
RAD ONC MSQ PRESCRIBED TOTAL DOSE: 3000 CGRAY
RAD ONC MSQ PRESCRIPTION PATTERN COMMENT: NORMAL
RAD ONC MSQ START DATE: NORMAL
RAD ONC MSQ TREATMENT COURSE NUMBER: 1
RAD ONC MSQ TREATMENT SITE: NORMAL

## 2025-09-02 PROCEDURE — 77280 THER RAD SIMULAJ FIELD SMPL: CPT | Performed by: RADIOLOGY

## 2025-09-02 PROCEDURE — C1717 BRACHYTX, NON-STR,HDR IR-192: HCPCS | Performed by: RADIOLOGY

## 2025-09-02 PROCEDURE — 57156 INS VAG BRACHYTX DEVICE: CPT | Performed by: RADIOLOGY

## 2025-09-02 PROCEDURE — 77770 HDR RDNCL NTRSTL/ICAV BRCHTX: CPT | Performed by: RADIOLOGY

## 2025-09-04 ENCOUNTER — HOSPITAL ENCOUNTER (OUTPATIENT)
Dept: RADIATION ONCOLOGY | Facility: HOSPITAL | Age: 65
Setting detail: RADIATION/ONCOLOGY SERIES
Discharge: HOME | End: 2025-09-04
Payer: COMMERCIAL

## 2025-09-04 DIAGNOSIS — C54.1 MALIGNANT NEOPLASM OF ENDOMETRIUM (MULTI): ICD-10-CM

## 2025-09-04 LAB
RAD ONC MSQ ACTUAL FRACTIONS DELIVERED: 5
RAD ONC MSQ ACTUAL SESSION DELIVERED DOSE: 600 CGRAY
RAD ONC MSQ ACTUAL TOTAL DOSE: 3000 CGRAY
RAD ONC MSQ ELAPSED DAYS: 14
RAD ONC MSQ LAST DATE: NORMAL
RAD ONC MSQ PRESCRIBED FRACTIONAL DOSE: 600 CGRAY
RAD ONC MSQ PRESCRIBED NUMBER OF FRACTIONS: 5
RAD ONC MSQ PRESCRIBED TECHNIQUE: NORMAL
RAD ONC MSQ PRESCRIBED TOTAL DOSE: 3000 CGRAY
RAD ONC MSQ PRESCRIPTION PATTERN COMMENT: NORMAL
RAD ONC MSQ START DATE: NORMAL
RAD ONC MSQ TREATMENT COURSE NUMBER: 1
RAD ONC MSQ TREATMENT SITE: NORMAL

## 2025-09-04 PROCEDURE — 77280 THER RAD SIMULAJ FIELD SMPL: CPT | Performed by: RADIOLOGY

## 2025-09-04 PROCEDURE — 57156 INS VAG BRACHYTX DEVICE: CPT | Performed by: RADIOLOGY

## 2025-09-04 PROCEDURE — 77770 HDR RDNCL NTRSTL/ICAV BRCHTX: CPT | Performed by: RADIOLOGY

## 2025-09-04 PROCEDURE — C1717 BRACHYTX, NON-STR,HDR IR-192: HCPCS | Performed by: RADIOLOGY

## 2025-09-04 PROCEDURE — 77336 RADIATION PHYSICS CONSULT: CPT | Mod: 59 | Performed by: RADIOLOGY

## 2025-11-11 ENCOUNTER — APPOINTMENT (OUTPATIENT)
Dept: GASTROENTEROLOGY | Facility: CLINIC | Age: 65
End: 2025-11-11
Payer: COMMERCIAL

## (undated) DEVICE — COVER, CART, 45 X 27 X 48 IN, CLEAR

## (undated) DEVICE — DRESSING, ADHESIVE, ISLAND, TELFA, 2 X 3.75 IN, LF

## (undated) DEVICE — PREP TRAY, SKIN, DRY, W/GLOVES

## (undated) DEVICE — GOWN, SURGICAL, SMARTGOWN, XLARGE, STERILE

## (undated) DEVICE — MANIFOLD, 4 PORT NEPTUNE STANDARD

## (undated) DEVICE — IRRIGATION SET, CYSTOSCOPY, F/CONSTANT/INTERMITTENT, 8 GTT/CC, 77 IN

## (undated) DEVICE — DRAPE, TIBURON W/ADHESIVE, 19 X 30

## (undated) DEVICE — SUTURE, VICRYL, 0, 27 IN, UR-6, VIOLET

## (undated) DEVICE — SYSTEM, FIOS FIRST ENTRY, 5 X 100MM, KII ADVANCED FIXATION

## (undated) DEVICE — TOWEL, SURGICAL, NEURO, O/R, 16 X 26, BLUE, STERILE

## (undated) DEVICE — SYRINGE, 10 CC, LUER LOCK

## (undated) DEVICE — POSITIONING, THE PINK PAD, PIGAZZI SYSTEM

## (undated) DEVICE — HOLSTER, JET SAFETY

## (undated) DEVICE — TUBE, SALEM SUMP, 16 FR X 48IN, ENFIT

## (undated) DEVICE — ELECTRODE, OPTI2 LAPAROSCOPIC SPATULA, CURVED

## (undated) DEVICE — TUBE SET, PNEUMOCLEAR, SMOKE EVACU, HIGH-FLOW

## (undated) DEVICE — Device

## (undated) DEVICE — RETRACTOR, CERVICAL CUP, VCARE, STANDARD

## (undated) DEVICE — SUTURE, MONOCRYL, 4-0, 18 IN, PS2, UNDYED

## (undated) DEVICE — LIGASURE, V SEALER/DIVIDER  5MM BLUNT TIP

## (undated) DEVICE — SUTURE, V-LOC, 0, 12IN, GS-21, GR 180 ABS

## (undated) DEVICE — PUMP, STRYKERFLOW 2 & HANDPIECE W/10FT. IRRIGATION TUBING

## (undated) DEVICE — DRAPE, PAD, PREP, W/ 9 IN CUFF, 24 X 41, LF, NS

## (undated) DEVICE — REST, HEAD, BAGEL, 9 IN

## (undated) DEVICE — SYRINGE, W/CANNULA, VIAL ACCESS, INTERLINK, W/NEEDLE, 15 G

## (undated) DEVICE — SYRINGE, 60 CC, LUER LOCK, MONOJECT, W/O CAP, LF

## (undated) DEVICE — TROCAR SYSTEM, BALLOON, KII GELPORT, 12 X 100MM